# Patient Record
Sex: FEMALE | Race: WHITE | NOT HISPANIC OR LATINO | URBAN - METROPOLITAN AREA
[De-identification: names, ages, dates, MRNs, and addresses within clinical notes are randomized per-mention and may not be internally consistent; named-entity substitution may affect disease eponyms.]

---

## 2022-09-01 ENCOUNTER — INPATIENT HOSPITAL (OUTPATIENT)
Dept: URBAN - METROPOLITAN AREA HOSPITAL 107 | Facility: HOSPITAL | Age: 62
End: 2022-09-01

## 2022-09-01 DIAGNOSIS — R93.3 ABNORMAL FINDINGS ON DIAGNOSTIC IMAGING OF OTHER PARTS OF DI: ICD-10-CM

## 2022-09-01 DIAGNOSIS — K62.5 HEMORRHAGE OF ANUS AND RECTUM: ICD-10-CM

## 2022-09-01 DIAGNOSIS — D50.9 IRON DEFICIENCY ANEMIA, UNSPECIFIED: ICD-10-CM

## 2022-09-01 PROCEDURE — 99222 1ST HOSP IP/OBS MODERATE 55: CPT | Performed by: NURSE PRACTITIONER

## 2022-09-02 ENCOUNTER — INPATIENT HOSPITAL (OUTPATIENT)
Dept: URBAN - METROPOLITAN AREA HOSPITAL 107 | Facility: HOSPITAL | Age: 62
End: 2022-09-02

## 2022-09-02 DIAGNOSIS — K44.9 DIAPHRAGMATIC HERNIA WITHOUT OBSTRUCTION OR GANGRENE: ICD-10-CM

## 2022-09-02 DIAGNOSIS — K21.00 GASTRO-ESOPHAGEAL REFLUX DISEASE WITH ESOPHAGITIS, WITHOUT B: ICD-10-CM

## 2022-09-02 DIAGNOSIS — K29.70 GASTRITIS, UNSPECIFIED, WITHOUT BLEEDING: ICD-10-CM

## 2022-09-02 DIAGNOSIS — D50.0 IRON DEFICIENCY ANEMIA SECONDARY TO BLOOD LOSS (CHRONIC): ICD-10-CM

## 2022-09-02 PROCEDURE — 45378 DIAGNOSTIC COLONOSCOPY: CPT | Mod: 52 | Performed by: INTERNAL MEDICINE

## 2022-09-02 PROCEDURE — 43239 EGD BIOPSY SINGLE/MULTIPLE: CPT | Performed by: INTERNAL MEDICINE

## 2022-09-03 ENCOUNTER — INPATIENT HOSPITAL (OUTPATIENT)
Dept: URBAN - METROPOLITAN AREA HOSPITAL 107 | Facility: HOSPITAL | Age: 62
End: 2022-09-03

## 2022-09-03 DIAGNOSIS — D50.0 IRON DEFICIENCY ANEMIA SECONDARY TO BLOOD LOSS (CHRONIC): ICD-10-CM

## 2022-09-03 DIAGNOSIS — R19.5 OTHER FECAL ABNORMALITIES: ICD-10-CM

## 2022-09-03 PROCEDURE — 45378 DIAGNOSTIC COLONOSCOPY: CPT | Performed by: INTERNAL MEDICINE

## 2023-04-13 ENCOUNTER — INPATIENT HOSPITAL (OUTPATIENT)
Dept: URBAN - METROPOLITAN AREA HOSPITAL 107 | Facility: HOSPITAL | Age: 63
End: 2023-04-13

## 2023-04-13 DIAGNOSIS — D50.9 IRON DEFICIENCY ANEMIA, UNSPECIFIED: ICD-10-CM

## 2023-04-13 DIAGNOSIS — R19.5 OTHER FECAL ABNORMALITIES: ICD-10-CM

## 2023-04-13 PROCEDURE — 99222 1ST HOSP IP/OBS MODERATE 55: CPT | Performed by: NURSE PRACTITIONER

## 2023-04-14 ENCOUNTER — INPATIENT HOSPITAL (OUTPATIENT)
Dept: URBAN - METROPOLITAN AREA HOSPITAL 107 | Facility: HOSPITAL | Age: 63
End: 2023-04-14

## 2023-04-14 DIAGNOSIS — K31.84 GASTROPARESIS: ICD-10-CM

## 2023-04-14 DIAGNOSIS — R19.5 OTHER FECAL ABNORMALITIES: ICD-10-CM

## 2023-04-14 PROCEDURE — 99232 SBSQ HOSP IP/OBS MODERATE 35: CPT | Performed by: PHYSICIAN ASSISTANT

## 2023-04-15 ENCOUNTER — INPATIENT HOSPITAL (OUTPATIENT)
Dept: URBAN - METROPOLITAN AREA HOSPITAL 107 | Facility: HOSPITAL | Age: 63
End: 2023-04-15

## 2023-04-15 DIAGNOSIS — D64.9 ANEMIA, UNSPECIFIED: ICD-10-CM

## 2023-04-15 PROCEDURE — 99232 SBSQ HOSP IP/OBS MODERATE 35: CPT | Performed by: INTERNAL MEDICINE

## 2023-04-17 ENCOUNTER — INPATIENT HOSPITAL (OUTPATIENT)
Dept: URBAN - METROPOLITAN AREA HOSPITAL 107 | Facility: HOSPITAL | Age: 63
End: 2023-04-17
Payer: MEDICARE

## 2023-04-17 DIAGNOSIS — D50.9 IRON DEFICIENCY ANEMIA, UNSPECIFIED: ICD-10-CM

## 2023-04-17 DIAGNOSIS — K44.9 DIAPHRAGMATIC HERNIA WITHOUT OBSTRUCTION OR GANGRENE: ICD-10-CM

## 2023-04-17 DIAGNOSIS — K20.90 ESOPHAGITIS, UNSPECIFIED WITHOUT BLEEDING: ICD-10-CM

## 2023-04-17 DIAGNOSIS — K31.819 ANGIODYSPLASIA OF STOMACH AND DUODENUM WITHOUT BLEEDING: ICD-10-CM

## 2023-04-17 PROCEDURE — 43239 EGD BIOPSY SINGLE/MULTIPLE: CPT | Mod: 59 | Performed by: INTERNAL MEDICINE

## 2023-04-17 PROCEDURE — 43270 EGD LESION ABLATION: CPT | Performed by: INTERNAL MEDICINE

## 2023-05-27 ENCOUNTER — INPATIENT HOSPITAL (OUTPATIENT)
Dept: URBAN - METROPOLITAN AREA HOSPITAL 107 | Facility: HOSPITAL | Age: 63
End: 2023-05-27
Payer: MEDICARE

## 2023-05-27 DIAGNOSIS — K92.0 HEMATEMESIS: ICD-10-CM

## 2023-05-27 PROCEDURE — 99222 1ST HOSP IP/OBS MODERATE 55: CPT | Performed by: INTERNAL MEDICINE

## 2023-05-30 ENCOUNTER — INPATIENT HOSPITAL (OUTPATIENT)
Dept: URBAN - METROPOLITAN AREA HOSPITAL 107 | Facility: HOSPITAL | Age: 63
End: 2023-05-30

## 2023-05-30 DIAGNOSIS — K31.84 GASTROPARESIS: ICD-10-CM

## 2023-05-30 DIAGNOSIS — D50.9 IRON DEFICIENCY ANEMIA, UNSPECIFIED: ICD-10-CM

## 2023-05-30 DIAGNOSIS — K20.90 ESOPHAGITIS, UNSPECIFIED WITHOUT BLEEDING: ICD-10-CM

## 2023-05-30 PROCEDURE — 99232 SBSQ HOSP IP/OBS MODERATE 35: CPT | Performed by: NURSE PRACTITIONER

## 2023-09-20 ENCOUNTER — OFFICE (OUTPATIENT)
Dept: URBAN - METROPOLITAN AREA CLINIC 76 | Facility: CLINIC | Age: 63
End: 2023-09-20
Payer: MEDICARE

## 2023-09-20 VITALS
OXYGEN SATURATION: 95 % | WEIGHT: 84 LBS | HEART RATE: 76 BPM | HEIGHT: 63 IN | DIASTOLIC BLOOD PRESSURE: 80 MMHG | SYSTOLIC BLOOD PRESSURE: 120 MMHG

## 2023-09-20 DIAGNOSIS — R11.2 NAUSEA WITH VOMITING, UNSPECIFIED: ICD-10-CM

## 2023-09-20 DIAGNOSIS — R63.4 ABNORMAL WEIGHT LOSS: ICD-10-CM

## 2023-09-20 DIAGNOSIS — K31.84 GASTROPARESIS: ICD-10-CM

## 2023-09-20 DIAGNOSIS — R19.4 CHANGE IN BOWEL HABIT: ICD-10-CM

## 2023-09-20 DIAGNOSIS — K92.1 MELENA: ICD-10-CM

## 2023-09-20 PROCEDURE — 99214 OFFICE O/P EST MOD 30 MIN: CPT | Performed by: INTERNAL MEDICINE

## 2023-09-20 RX ORDER — PANTOPRAZOLE SODIUM 40 MG/1
80 TABLET, DELAYED RELEASE ORAL
Qty: 90 | Refills: 4 | Status: ACTIVE
Start: 2023-09-20

## 2023-12-20 ENCOUNTER — OFFICE (OUTPATIENT)
Dept: URBAN - METROPOLITAN AREA CLINIC 76 | Facility: CLINIC | Age: 63
End: 2023-12-20

## 2023-12-20 VITALS
HEART RATE: 83 BPM | OXYGEN SATURATION: 83 % | WEIGHT: 87 LBS | DIASTOLIC BLOOD PRESSURE: 78 MMHG | HEIGHT: 63 IN | SYSTOLIC BLOOD PRESSURE: 109 MMHG

## 2023-12-20 DIAGNOSIS — K20.90 ESOPHAGITIS, UNSPECIFIED WITHOUT BLEEDING: ICD-10-CM

## 2023-12-20 DIAGNOSIS — K31.84 GASTROPARESIS: ICD-10-CM

## 2023-12-20 DIAGNOSIS — R63.4 ABNORMAL WEIGHT LOSS: ICD-10-CM

## 2023-12-20 DIAGNOSIS — R13.10 DYSPHAGIA, UNSPECIFIED: ICD-10-CM

## 2023-12-20 DIAGNOSIS — R11.2 NAUSEA WITH VOMITING, UNSPECIFIED: ICD-10-CM

## 2023-12-20 DIAGNOSIS — K59.00 CONSTIPATION, UNSPECIFIED: ICD-10-CM

## 2023-12-20 PROCEDURE — 99214 OFFICE O/P EST MOD 30 MIN: CPT

## 2023-12-20 RX ORDER — PROMETHAZINE HYDROCHLORIDE 25 MG/1
100 TABLET ORAL
Qty: 40 | Refills: 5 | Status: ACTIVE
Start: 2023-12-20

## 2024-02-16 ENCOUNTER — ON CAMPUS - OUTPATIENT (OUTPATIENT)
Dept: URBAN - METROPOLITAN AREA HOSPITAL 108 | Facility: HOSPITAL | Age: 64
End: 2024-02-16
Payer: COMMERCIAL

## 2024-02-16 DIAGNOSIS — R13.10 DYSPHAGIA, UNSPECIFIED: ICD-10-CM

## 2024-02-16 DIAGNOSIS — K20.91 ESOPHAGITIS, UNSPECIFIED WITH BLEEDING: ICD-10-CM

## 2024-02-16 DIAGNOSIS — K31.7 POLYP OF STOMACH AND DUODENUM: ICD-10-CM

## 2024-02-16 DIAGNOSIS — Z87.19 PERSONAL HISTORY OF OTHER DISEASES OF THE DIGESTIVE SYSTEM: ICD-10-CM

## 2024-02-16 PROCEDURE — 43239 EGD BIOPSY SINGLE/MULTIPLE: CPT | Performed by: INTERNAL MEDICINE

## 2024-02-26 ENCOUNTER — HOSPITAL ENCOUNTER (OUTPATIENT)
Facility: HOSPITAL | Age: 64
Setting detail: OBSERVATION
Discharge: HOME | End: 2024-02-29
Attending: EMERGENCY MEDICINE | Admitting: HOSPITALIST
Payer: COMMERCIAL

## 2024-02-26 DIAGNOSIS — L03.116 CELLULITIS OF LEFT LOWER EXTREMITY: Primary | ICD-10-CM

## 2024-02-26 DIAGNOSIS — R79.89 ELEVATED D-DIMER: ICD-10-CM

## 2024-02-26 DIAGNOSIS — M54.42 CHRONIC MIDLINE LOW BACK PAIN WITH LEFT-SIDED SCIATICA: ICD-10-CM

## 2024-02-26 DIAGNOSIS — G89.29 CHRONIC MIDLINE LOW BACK PAIN WITH LEFT-SIDED SCIATICA: ICD-10-CM

## 2024-02-26 LAB
ALBUMIN SERPL-MCNC: 3.7 G/DL (ref 3.5–5.2)
ALBUMIN/GLOB SERPL: 0.9 G/DL
ALP SERPL-CCNC: 104 U/L (ref 39–117)
ALT SERPL W P-5'-P-CCNC: 11 U/L (ref 1–33)
ANION GAP SERPL CALCULATED.3IONS-SCNC: 8.7 MMOL/L (ref 5–15)
AST SERPL-CCNC: 17 U/L (ref 1–32)
BASOPHILS # BLD AUTO: 0.1 10*3/MM3 (ref 0–0.2)
BASOPHILS NFR BLD AUTO: 0.6 % (ref 0–1.5)
BILIRUB SERPL-MCNC: <0.2 MG/DL (ref 0–1.2)
BUN SERPL-MCNC: 23 MG/DL (ref 8–23)
BUN/CREAT SERPL: 40.4 (ref 7–25)
CALCIUM SPEC-SCNC: 9.3 MG/DL (ref 8.6–10.5)
CHLORIDE SERPL-SCNC: 100 MMOL/L (ref 98–107)
CO2 SERPL-SCNC: 30.3 MMOL/L (ref 22–29)
CREAT SERPL-MCNC: 0.57 MG/DL (ref 0.57–1)
D DIMER PPP FEU-MCNC: 2.04 MCGFEU/ML (ref 0–0.61)
D-LACTATE SERPL-SCNC: 0.9 MMOL/L (ref 0.5–2)
DEPRECATED RDW RBC AUTO: 41.5 FL (ref 37–54)
EGFRCR SERPLBLD CKD-EPI 2021: 103.5 ML/MIN/1.73
EOSINOPHIL # BLD AUTO: 0.81 10*3/MM3 (ref 0–0.4)
EOSINOPHIL NFR BLD AUTO: 4.7 % (ref 0.3–6.2)
ERYTHROCYTE [DISTWIDTH] IN BLOOD BY AUTOMATED COUNT: 14.2 % (ref 12.3–15.4)
GLOBULIN UR ELPH-MCNC: 4.2 GM/DL
GLUCOSE SERPL-MCNC: 103 MG/DL (ref 65–99)
HCT VFR BLD AUTO: 35.1 % (ref 34–46.6)
HGB BLD-MCNC: 11 G/DL (ref 12–15.9)
IMM GRANULOCYTES # BLD AUTO: 0.06 10*3/MM3 (ref 0–0.05)
IMM GRANULOCYTES NFR BLD AUTO: 0.4 % (ref 0–0.5)
LYMPHOCYTES # BLD AUTO: 1.6 10*3/MM3 (ref 0.7–3.1)
LYMPHOCYTES NFR BLD AUTO: 9.3 % (ref 19.6–45.3)
MCH RBC QN AUTO: 25.9 PG (ref 26.6–33)
MCHC RBC AUTO-ENTMCNC: 31.3 G/DL (ref 31.5–35.7)
MCV RBC AUTO: 82.8 FL (ref 79–97)
MONOCYTES # BLD AUTO: 0.95 10*3/MM3 (ref 0.1–0.9)
MONOCYTES NFR BLD AUTO: 5.5 % (ref 5–12)
NEUTROPHILS NFR BLD AUTO: 13.62 10*3/MM3 (ref 1.7–7)
NEUTROPHILS NFR BLD AUTO: 79.5 % (ref 42.7–76)
NRBC BLD AUTO-RTO: 0 /100 WBC (ref 0–0.2)
PLATELET # BLD AUTO: 540 10*3/MM3 (ref 140–450)
PMV BLD AUTO: 9.5 FL (ref 6–12)
POTASSIUM SERPL-SCNC: 4.2 MMOL/L (ref 3.5–5.2)
PROT SERPL-MCNC: 7.9 G/DL (ref 6–8.5)
RBC # BLD AUTO: 4.24 10*6/MM3 (ref 3.77–5.28)
SODIUM SERPL-SCNC: 139 MMOL/L (ref 136–145)
WBC NRBC COR # BLD AUTO: 17.14 10*3/MM3 (ref 3.4–10.8)

## 2024-02-26 PROCEDURE — 25010000002 ENOXAPARIN PER 10 MG: Performed by: EMERGENCY MEDICINE

## 2024-02-26 PROCEDURE — 99285 EMERGENCY DEPT VISIT HI MDM: CPT

## 2024-02-26 PROCEDURE — 25010000002 HYDROMORPHONE PER 4 MG: Performed by: EMERGENCY MEDICINE

## 2024-02-26 PROCEDURE — 96374 THER/PROPH/DIAG INJ IV PUSH: CPT

## 2024-02-26 PROCEDURE — 83605 ASSAY OF LACTIC ACID: CPT | Performed by: EMERGENCY MEDICINE

## 2024-02-26 PROCEDURE — 96376 TX/PRO/DX INJ SAME DRUG ADON: CPT

## 2024-02-26 PROCEDURE — 25010000002 ONDANSETRON PER 1 MG: Performed by: EMERGENCY MEDICINE

## 2024-02-26 PROCEDURE — 36415 COLL VENOUS BLD VENIPUNCTURE: CPT | Performed by: EMERGENCY MEDICINE

## 2024-02-26 PROCEDURE — 25010000002 CEFTRIAXONE PER 250 MG: Performed by: EMERGENCY MEDICINE

## 2024-02-26 PROCEDURE — G0378 HOSPITAL OBSERVATION PER HR: HCPCS

## 2024-02-26 PROCEDURE — 85025 COMPLETE CBC W/AUTO DIFF WBC: CPT | Performed by: EMERGENCY MEDICINE

## 2024-02-26 PROCEDURE — 87040 BLOOD CULTURE FOR BACTERIA: CPT | Performed by: EMERGENCY MEDICINE

## 2024-02-26 PROCEDURE — 80053 COMPREHEN METABOLIC PANEL: CPT | Performed by: EMERGENCY MEDICINE

## 2024-02-26 PROCEDURE — 96375 TX/PRO/DX INJ NEW DRUG ADDON: CPT

## 2024-02-26 PROCEDURE — 85379 FIBRIN DEGRADATION QUANT: CPT | Performed by: EMERGENCY MEDICINE

## 2024-02-26 PROCEDURE — 96372 THER/PROPH/DIAG INJ SC/IM: CPT

## 2024-02-26 PROCEDURE — 25010000002 HYDROMORPHONE 1 MG/ML SOLUTION: Performed by: EMERGENCY MEDICINE

## 2024-02-26 RX ORDER — ONDANSETRON 2 MG/ML
4 INJECTION INTRAMUSCULAR; INTRAVENOUS ONCE
Status: COMPLETED | OUTPATIENT
Start: 2024-02-26 | End: 2024-02-26

## 2024-02-26 RX ORDER — SODIUM CHLORIDE 0.9 % (FLUSH) 0.9 %
10 SYRINGE (ML) INJECTION AS NEEDED
Status: DISCONTINUED | OUTPATIENT
Start: 2024-02-26 | End: 2024-02-29 | Stop reason: HOSPADM

## 2024-02-26 RX ORDER — HYDROCODONE BITARTRATE AND ACETAMINOPHEN 5; 325 MG/1; MG/1
1 TABLET ORAL EVERY 4 HOURS PRN
Status: DISCONTINUED | OUTPATIENT
Start: 2024-02-26 | End: 2024-02-27

## 2024-02-26 RX ORDER — HYDROCODONE BITARTRATE AND ACETAMINOPHEN 5; 325 MG/1; MG/1
1 TABLET ORAL EVERY 6 HOURS PRN
Status: DISCONTINUED | OUTPATIENT
Start: 2024-02-26 | End: 2024-02-26

## 2024-02-26 RX ORDER — HYDROMORPHONE HYDROCHLORIDE 1 MG/ML
0.5 INJECTION, SOLUTION INTRAMUSCULAR; INTRAVENOUS; SUBCUTANEOUS ONCE
Status: COMPLETED | OUTPATIENT
Start: 2024-02-26 | End: 2024-02-26

## 2024-02-26 RX ORDER — ERGOCALCIFEROL 1.25 MG/1
50000 CAPSULE ORAL WEEKLY
COMMUNITY

## 2024-02-26 RX ORDER — ENOXAPARIN SODIUM 100 MG/ML
1 INJECTION SUBCUTANEOUS ONCE
Status: COMPLETED | OUTPATIENT
Start: 2024-02-26 | End: 2024-02-26

## 2024-02-26 RX ORDER — DULOXETIN HYDROCHLORIDE 30 MG/1
30 CAPSULE, DELAYED RELEASE ORAL DAILY
COMMUNITY

## 2024-02-26 RX ORDER — PANTOPRAZOLE SODIUM 40 MG/1
40 TABLET, DELAYED RELEASE ORAL DAILY
COMMUNITY
End: 2024-02-29 | Stop reason: HOSPADM

## 2024-02-26 RX ORDER — PANTOPRAZOLE SODIUM 40 MG/1
40 TABLET, DELAYED RELEASE ORAL
Status: DISCONTINUED | OUTPATIENT
Start: 2024-02-27 | End: 2024-02-27

## 2024-02-26 RX ORDER — ONDANSETRON 2 MG/ML
4 INJECTION INTRAMUSCULAR; INTRAVENOUS EVERY 6 HOURS PRN
Status: DISCONTINUED | OUTPATIENT
Start: 2024-02-26 | End: 2024-02-27

## 2024-02-26 RX ORDER — AMOXICILLIN AND CLAVULANATE POTASSIUM 875; 125 MG/1; MG/1
1 TABLET, FILM COATED ORAL 2 TIMES DAILY
COMMUNITY
End: 2024-02-29 | Stop reason: HOSPADM

## 2024-02-26 RX ORDER — METHOCARBAMOL 500 MG/1
500 TABLET, FILM COATED ORAL DAILY
COMMUNITY
End: 2024-02-29 | Stop reason: HOSPADM

## 2024-02-26 RX ORDER — MONTELUKAST SODIUM 10 MG/1
10 TABLET ORAL DAILY
COMMUNITY

## 2024-02-26 RX ORDER — ENOXAPARIN SODIUM 100 MG/ML
1 INJECTION SUBCUTANEOUS EVERY 12 HOURS
Status: DISCONTINUED | OUTPATIENT
Start: 2024-02-27 | End: 2024-02-29

## 2024-02-26 RX ORDER — CYCLOBENZAPRINE HCL 5 MG
5 TABLET ORAL 3 TIMES DAILY PRN
COMMUNITY
End: 2024-02-29 | Stop reason: HOSPADM

## 2024-02-26 RX ORDER — LITHIUM CARBONATE 150 MG/1
150 CAPSULE ORAL 2 TIMES DAILY
COMMUNITY

## 2024-02-26 RX ADMIN — CEFTRIAXONE 2000 MG: 2 INJECTION, POWDER, FOR SOLUTION INTRAMUSCULAR; INTRAVENOUS at 21:14

## 2024-02-26 RX ADMIN — HYDROMORPHONE HYDROCHLORIDE 1 MG: 1 INJECTION, SOLUTION INTRAMUSCULAR; INTRAVENOUS; SUBCUTANEOUS at 19:36

## 2024-02-26 RX ADMIN — ENOXAPARIN SODIUM 40 MG: 100 INJECTION SUBCUTANEOUS at 20:22

## 2024-02-26 RX ADMIN — HYDROMORPHONE HYDROCHLORIDE 0.5 MG: 1 INJECTION, SOLUTION INTRAMUSCULAR; INTRAVENOUS; SUBCUTANEOUS at 20:06

## 2024-02-26 RX ADMIN — ONDANSETRON 4 MG: 2 INJECTION INTRAMUSCULAR; INTRAVENOUS at 19:37

## 2024-02-26 RX ADMIN — HYDROCODONE BITARTRATE AND ACETAMINOPHEN 1 TABLET: 5; 325 TABLET ORAL at 22:23

## 2024-02-27 ENCOUNTER — APPOINTMENT (OUTPATIENT)
Dept: CT IMAGING | Facility: HOSPITAL | Age: 64
End: 2024-02-27
Payer: COMMERCIAL

## 2024-02-27 LAB
ANION GAP SERPL CALCULATED.3IONS-SCNC: 13.4 MMOL/L (ref 5–15)
BASOPHILS # BLD AUTO: 0.11 10*3/MM3 (ref 0–0.2)
BASOPHILS NFR BLD AUTO: 0.7 % (ref 0–1.5)
BUN SERPL-MCNC: 16 MG/DL (ref 8–23)
BUN/CREAT SERPL: 29.1 (ref 7–25)
CALCIUM SPEC-SCNC: 9.1 MG/DL (ref 8.6–10.5)
CHLORIDE SERPL-SCNC: 100 MMOL/L (ref 98–107)
CO2 SERPL-SCNC: 26.6 MMOL/L (ref 22–29)
CREAT SERPL-MCNC: 0.55 MG/DL (ref 0.57–1)
DEPRECATED RDW RBC AUTO: 41.7 FL (ref 37–54)
EGFRCR SERPLBLD CKD-EPI 2021: 104.4 ML/MIN/1.73
EOSINOPHIL # BLD AUTO: 0.97 10*3/MM3 (ref 0–0.4)
EOSINOPHIL NFR BLD AUTO: 6.4 % (ref 0.3–6.2)
ERYTHROCYTE [DISTWIDTH] IN BLOOD BY AUTOMATED COUNT: 13.9 % (ref 12.3–15.4)
GLUCOSE SERPL-MCNC: 107 MG/DL (ref 65–99)
HCT VFR BLD AUTO: 35.8 % (ref 34–46.6)
HGB BLD-MCNC: 11.2 G/DL (ref 12–15.9)
IMM GRANULOCYTES # BLD AUTO: 0.06 10*3/MM3 (ref 0–0.05)
IMM GRANULOCYTES NFR BLD AUTO: 0.4 % (ref 0–0.5)
LYMPHOCYTES # BLD AUTO: 2.1 10*3/MM3 (ref 0.7–3.1)
LYMPHOCYTES NFR BLD AUTO: 13.9 % (ref 19.6–45.3)
MCH RBC QN AUTO: 26.1 PG (ref 26.6–33)
MCHC RBC AUTO-ENTMCNC: 31.3 G/DL (ref 31.5–35.7)
MCV RBC AUTO: 83.4 FL (ref 79–97)
MONOCYTES # BLD AUTO: 1.01 10*3/MM3 (ref 0.1–0.9)
MONOCYTES NFR BLD AUTO: 6.7 % (ref 5–12)
NEUTROPHILS NFR BLD AUTO: 10.85 10*3/MM3 (ref 1.7–7)
NEUTROPHILS NFR BLD AUTO: 71.9 % (ref 42.7–76)
NRBC BLD AUTO-RTO: 0 /100 WBC (ref 0–0.2)
PLATELET # BLD AUTO: 566 10*3/MM3 (ref 140–450)
PMV BLD AUTO: 9.9 FL (ref 6–12)
POTASSIUM SERPL-SCNC: 4 MMOL/L (ref 3.5–5.2)
RBC # BLD AUTO: 4.29 10*6/MM3 (ref 3.77–5.28)
SODIUM SERPL-SCNC: 140 MMOL/L (ref 136–145)
WBC NRBC COR # BLD AUTO: 15.1 10*3/MM3 (ref 3.4–10.8)

## 2024-02-27 PROCEDURE — 96376 TX/PRO/DX INJ SAME DRUG ADON: CPT

## 2024-02-27 PROCEDURE — 25010000002 ONDANSETRON PER 1 MG: Performed by: HOSPITALIST

## 2024-02-27 PROCEDURE — 99204 OFFICE O/P NEW MOD 45 MIN: CPT | Performed by: PHYSICIAN ASSISTANT

## 2024-02-27 PROCEDURE — 74177 CT ABD & PELVIS W/CONTRAST: CPT

## 2024-02-27 PROCEDURE — 25010000002 ENOXAPARIN PER 10 MG: Performed by: HOSPITALIST

## 2024-02-27 PROCEDURE — 94640 AIRWAY INHALATION TREATMENT: CPT

## 2024-02-27 PROCEDURE — G0378 HOSPITAL OBSERVATION PER HR: HCPCS

## 2024-02-27 PROCEDURE — 25010000002 CEFAZOLIN IN DEXTROSE 2-4 GM/100ML-% SOLUTION: Performed by: HOSPITALIST

## 2024-02-27 PROCEDURE — 25010000002 HYDROMORPHONE PER 4 MG: Performed by: HOSPITALIST

## 2024-02-27 PROCEDURE — 25010000002 VANCOMYCIN 750 MG RECONSTITUTED SOLUTION 1 EACH VIAL: Performed by: INTERNAL MEDICINE

## 2024-02-27 PROCEDURE — 96372 THER/PROPH/DIAG INJ SC/IM: CPT

## 2024-02-27 PROCEDURE — 25810000003 SODIUM CHLORIDE 0.9 % SOLUTION 250 ML FLEX CONT: Performed by: INTERNAL MEDICINE

## 2024-02-27 PROCEDURE — 80048 BASIC METABOLIC PNL TOTAL CA: CPT | Performed by: HOSPITALIST

## 2024-02-27 PROCEDURE — 94799 UNLISTED PULMONARY SVC/PX: CPT

## 2024-02-27 PROCEDURE — 85025 COMPLETE CBC W/AUTO DIFF WBC: CPT | Performed by: HOSPITALIST

## 2024-02-27 PROCEDURE — 96375 TX/PRO/DX INJ NEW DRUG ADDON: CPT

## 2024-02-27 PROCEDURE — 25510000001 IOPAMIDOL 61 % SOLUTION: Performed by: HOSPITALIST

## 2024-02-27 RX ORDER — LITHIUM CARBONATE 150 MG/1
150 CAPSULE ORAL 2 TIMES DAILY
Status: DISCONTINUED | OUTPATIENT
Start: 2024-02-27 | End: 2024-02-29 | Stop reason: HOSPADM

## 2024-02-27 RX ORDER — MONTELUKAST SODIUM 10 MG/1
10 TABLET ORAL DAILY
Status: DISCONTINUED | OUTPATIENT
Start: 2024-02-27 | End: 2024-02-29 | Stop reason: HOSPADM

## 2024-02-27 RX ORDER — METHOCARBAMOL 500 MG/1
500 TABLET, FILM COATED ORAL EVERY 6 HOURS PRN
Status: DISCONTINUED | OUTPATIENT
Start: 2024-02-27 | End: 2024-02-29

## 2024-02-27 RX ORDER — PANTOPRAZOLE SODIUM 40 MG/10ML
40 INJECTION, POWDER, LYOPHILIZED, FOR SOLUTION INTRAVENOUS EVERY 12 HOURS SCHEDULED
Status: DISCONTINUED | OUTPATIENT
Start: 2024-02-27 | End: 2024-02-29

## 2024-02-27 RX ORDER — CEFAZOLIN SODIUM 2 G/100ML
2000 INJECTION, SOLUTION INTRAVENOUS EVERY 8 HOURS
Status: DISCONTINUED | OUTPATIENT
Start: 2024-02-27 | End: 2024-02-29

## 2024-02-27 RX ORDER — HYDROMORPHONE HYDROCHLORIDE 1 MG/ML
0.5 INJECTION, SOLUTION INTRAMUSCULAR; INTRAVENOUS; SUBCUTANEOUS ONCE
Status: DISCONTINUED | OUTPATIENT
Start: 2024-02-27 | End: 2024-02-27

## 2024-02-27 RX ORDER — DULOXETIN HYDROCHLORIDE 30 MG/1
30 CAPSULE, DELAYED RELEASE ORAL DAILY
Status: DISCONTINUED | OUTPATIENT
Start: 2024-02-27 | End: 2024-02-29 | Stop reason: HOSPADM

## 2024-02-27 RX ORDER — PANTOPRAZOLE SODIUM 40 MG/1
40 TABLET, DELAYED RELEASE ORAL
Status: DISCONTINUED | OUTPATIENT
Start: 2024-02-27 | End: 2024-02-27

## 2024-02-27 RX ORDER — ONDANSETRON 2 MG/ML
4 INJECTION INTRAMUSCULAR; INTRAVENOUS EVERY 4 HOURS PRN
Status: DISCONTINUED | OUTPATIENT
Start: 2024-02-27 | End: 2024-02-28

## 2024-02-27 RX ORDER — METHOCARBAMOL 500 MG/1
500 TABLET, FILM COATED ORAL DAILY
Status: DISCONTINUED | OUTPATIENT
Start: 2024-02-27 | End: 2024-02-27

## 2024-02-27 RX ORDER — AMITRIPTYLINE HYDROCHLORIDE 50 MG/1
50 TABLET, FILM COATED ORAL NIGHTLY
Status: DISCONTINUED | OUTPATIENT
Start: 2024-02-27 | End: 2024-02-29 | Stop reason: HOSPADM

## 2024-02-27 RX ORDER — IPRATROPIUM BROMIDE AND ALBUTEROL SULFATE 2.5; .5 MG/3ML; MG/3ML
3 SOLUTION RESPIRATORY (INHALATION) EVERY 4 HOURS PRN
Status: DISCONTINUED | OUTPATIENT
Start: 2024-02-27 | End: 2024-02-29

## 2024-02-27 RX ORDER — AMLODIPINE BESYLATE 5 MG/1
5 TABLET ORAL
Status: DISCONTINUED | OUTPATIENT
Start: 2024-02-27 | End: 2024-02-28

## 2024-02-27 RX ORDER — HYDROMORPHONE HYDROCHLORIDE 1 MG/ML
0.5 INJECTION, SOLUTION INTRAMUSCULAR; INTRAVENOUS; SUBCUTANEOUS EVERY 4 HOURS PRN
Status: DISCONTINUED | OUTPATIENT
Start: 2024-02-27 | End: 2024-02-29

## 2024-02-27 RX ADMIN — ONDANSETRON 4 MG: 2 INJECTION INTRAMUSCULAR; INTRAVENOUS at 14:42

## 2024-02-27 RX ADMIN — HYDROMORPHONE HYDROCHLORIDE 0.5 MG: 1 INJECTION, SOLUTION INTRAMUSCULAR; INTRAVENOUS; SUBCUTANEOUS at 18:04

## 2024-02-27 RX ADMIN — ONDANSETRON 4 MG: 2 INJECTION INTRAMUSCULAR; INTRAVENOUS at 19:53

## 2024-02-27 RX ADMIN — HYDROMORPHONE HYDROCHLORIDE 0.5 MG: 1 INJECTION, SOLUTION INTRAMUSCULAR; INTRAVENOUS; SUBCUTANEOUS at 13:57

## 2024-02-27 RX ADMIN — HYDROCODONE BITARTRATE AND ACETAMINOPHEN 1 TABLET: 5; 325 TABLET ORAL at 03:56

## 2024-02-27 RX ADMIN — TIOTROPIUM BROMIDE INHALATION SPRAY 1 PUFF: 3.12 SPRAY, METERED RESPIRATORY (INHALATION) at 12:14

## 2024-02-27 RX ADMIN — IOPAMIDOL 85 ML: 612 INJECTION, SOLUTION INTRAVENOUS at 22:05

## 2024-02-27 RX ADMIN — ONDANSETRON 4 MG: 2 INJECTION INTRAMUSCULAR; INTRAVENOUS at 05:01

## 2024-02-27 RX ADMIN — HYDROCODONE BITARTRATE AND ACETAMINOPHEN 1 TABLET: 5; 325 TABLET ORAL at 09:29

## 2024-02-27 RX ADMIN — VANCOMYCIN HYDROCHLORIDE 750 MG: 750 INJECTION, POWDER, LYOPHILIZED, FOR SOLUTION INTRAVENOUS at 23:17

## 2024-02-27 RX ADMIN — ENOXAPARIN SODIUM 40 MG: 100 INJECTION SUBCUTANEOUS at 09:29

## 2024-02-27 RX ADMIN — AMLODIPINE BESYLATE 5 MG: 5 TABLET ORAL at 18:04

## 2024-02-27 RX ADMIN — CEFAZOLIN SODIUM 2000 MG: 2 INJECTION, SOLUTION INTRAVENOUS at 14:49

## 2024-02-27 RX ADMIN — HYDROMORPHONE HYDROCHLORIDE 0.5 MG: 1 INJECTION, SOLUTION INTRAMUSCULAR; INTRAVENOUS; SUBCUTANEOUS at 22:29

## 2024-02-27 RX ADMIN — PANTOPRAZOLE SODIUM 40 MG: 40 INJECTION, POWDER, FOR SOLUTION INTRAVENOUS at 20:44

## 2024-02-27 NOTE — CONSULTS
[facility]  Initial Inpatient Consult Note    Referring Provider: Dr. Rodrigues    Reason for Consultation: esophagitis, gastroparesis, coffee ground emesis    Subjective     History of present illness:    63 y.o. female, a patient of Germán BURCH (Dr. Shon Springer) w/ PMHx of IBS, GERD, HTN, COPD, gastroparesis, presented to the ED w/ acute L leg pain and swelling, found to have cellulitis. DVT is being ruled out, imaging pending.     GI consulted for ongoing coffee ground emesis that just started. She reports ongoing issue w/ gastroparesis that she manages via diet. Has had issues w/ GI bleed in the past as well. Denies melena currently or hematochezia. Denies NSAIDs use or ASA use at home.     Reviewed records [facility]:     EGD 2024:  LA grade D esophagitis w/oozing was found in distal esophagus. Biopsied. Few sessile polyps w/ no stigmata of recent bleeding found in gastric body. Entire examined stomach was otherwise normal. Examined duodenum was normal.   Path: Reflux esophagitis and reactive epithelial changes.     EGD in 2023  Showing esophagitis, duodenal AVM, s\p APC, HH, esophageal nodule.   EGD in :   shows distal esophageal ulceration, no H.pylori, no celiac on histology.     Past Medical History:  Past Medical History:   Diagnosis Date    Anxiety and depression     Arthritis     GERD (gastroesophageal reflux disease)     Hypertension     IBS (irritable bowel syndrome)      Past Surgical History:  Past Surgical History:   Procedure Laterality Date    BACK SURGERY       SECTION      GASTROSTOMY FEEDING TUBE INSERTION      LAPAROSCOPIC TUBAL LIGATION        Social History:   Social History     Tobacco Use    Smoking status: Never    Smokeless tobacco: Never   Substance Use Topics    Alcohol use: Yes     Comment: social      Family History:  Family History   Problem Relation Age of Onset    Depression Mother     Osteoporosis Mother     Alcohol abuse Mother     Uterine  cancer Mother     Depression Maternal Grandmother        Home Meds:  Medications Prior to Admission   Medication Sig Dispense Refill Last Dose    amoxicillin-clavulanate (AUGMENTIN) 875-125 MG per tablet Take 1 tablet by mouth 2 (Two) Times a Day.       cyclobenzaprine (FLEXERIL) 5 MG tablet Take 1 tablet by mouth 3 (Three) Times a Day As Needed for Muscle Spasms.       DULoxetine (CYMBALTA) 30 MG capsule Take 1 capsule by mouth Daily.       ipratropium-albuterol (COMBIVENT RESPIMAT)  MCG/ACT inhaler Inhale 1 puff 2 (Two) Times a Day.       lithium carbonate 150 MG capsule Take 1 capsule by mouth 2 (Two) Times a Day.       methocarbamol (ROBAXIN) 500 MG tablet Take 1 tablet by mouth Daily.       montelukast (SINGULAIR) 10 MG tablet Take 1 tablet by mouth Daily.       pantoprazole (PROTONIX) 40 MG EC tablet Take 1 tablet by mouth Daily.       promethazine (PHENERGAN) 25 MG tablet        tiotropium bromide monohydrate (SPIRIVA RESPIMAT) 2.5 MCG/ACT aerosol solution inhaler Inhale 1 puff Daily.       vitamin D (ERGOCALCIFEROL) 1.25 MG (56676 UT) capsule capsule Take 1 capsule by mouth 1 (One) Time Per Week. Takes every Monday       amitriptyline (ELAVIL) 50 MG tablet Take 50 mg by mouth.       conjugated estrogens (PREMARIN) 0.625 MG/GM vaginal cream Insert  into the vagina Daily. For 2 weeks and then 2-3 times a week as needed 1 each 1     omeprazole (priLOSEC) 40 MG capsule         Current Meds:   amitriptyline, 50 mg, Oral, Nightly  amLODIPine, 5 mg, Oral, Q24H  ceFAZolin, 2,000 mg, Intravenous, Q8H  DULoxetine, 30 mg, Oral, Daily  enoxaparin, 1 mg/kg, Subcutaneous, Q12H  lithium carbonate, 150 mg, Oral, BID  metoprolol tartrate, 12.5 mg, Oral, Q12H  montelukast, 10 mg, Oral, Daily  pantoprazole, 40 mg, Oral, BID AC  tiotropium bromide monohydrate, 1 puff, Inhalation, Daily - RT      Allergies:  Allergies   Allergen Reactions    Penicillins Hives       Objective     Vital Signs  Temp:  [97 °F (36.1 °C)-98.6 °F  (37 °C)] 98.6 °F (37 °C)  Heart Rate:  [] 88  Resp:  [16-20] 20  BP: (122-176)/() 152/106  Physical Exam:  General Appearance:     Alert, cooperative; in mild distress   Abdomen:     Normal bowel sounds, no masses, no organomegaly, soft     nontender, nondistended, no guarding, no rebound                 tenderness   Rectal:     Deferred       Results Review:   I reviewed the patient's new clinical results.    Results from last 7 days   Lab Units 02/27/24  0511 02/26/24  1939   WBC 10*3/mm3 15.10* 17.14*   HEMOGLOBIN g/dL 11.2* 11.0*   HEMATOCRIT % 35.8 35.1   PLATELETS 10*3/mm3 566* 540*     Results from last 7 days   Lab Units 02/27/24 0511 02/26/24 2004   SODIUM mmol/L 140 139   POTASSIUM mmol/L 4.0 4.2   CHLORIDE mmol/L 100 100   CO2 mmol/L 26.6 30.3*   BUN mg/dL 16 23   CREATININE mg/dL 0.55* 0.57   CALCIUM mg/dL 9.1 9.3   BILIRUBIN mg/dL  --  <0.2   ALK PHOS U/L  --  104   ALT (SGPT) U/L  --  11   AST (SGOT) U/L  --  17   GLUCOSE mg/dL 107* 103*         Lab Results   Lab Value Date/Time    LIPASE 6 05/22/2023 1356       Radiology:  No orders to display         Assessment:   Cellulitis  Coffee ground emesis- likely due to known esophagitis. Her n/v likely exacerbated by gastroparesis in setting of infection  LA grade D Esophagitis as seen on EGD 02/16/2024 at [facility]     Plan:   Will switch to PPI IV BID.   Monitor H/H, transfuse if Hgb < 7.  LE duplex pending today to r/o DVT. If there is evidence of DVT, okay to receive next dose lovenox subq, otherwise recommend holding the dose given ongoing GI bleed  Plan EGD tomorrow if H/H drops and ongoing, persistent GI bleed, otherwise would treat this conservatively with PPI. NPO at midnight and will reassess the need for EGD tomorrow am.     I discussed the patients findings and my recommendations with patient.         Gianfranco Jeffries PA-C  [facility]  [address] [id number]  Office: [phone]  [phone]

## 2024-02-27 NOTE — H&P
History and physical    Primary care physician      Chief complaint  Left leg swelling redness and pain    History of present illness  63-year-old white female with history of chronic hypoxic respiratory failure on home oxygen gastroparesis anxiety depression degenerative disc disease chronic pain syndrome presented to [facility] emergency room with left leg pain swelling and redness for last 4 days which is getting worse.  Patient denies any fall trauma injury.  Patient denies any fever chills chest pain increase shortness of breath palpitation.  Patient evaluated in ER found to have left lower extremity colitis and also found to have elevated D-dimer need to rule out DVT admitted for management.  At the time of examination she is in a lot of pain asking for pain medication.  Patient give me detailed history and her  also contributed.    PAST MEDICAL HISTORY   Anxiety and depression      Gastroparesis      Gastroesophageal reflux disease      Hypertension      Irritable bowel syndrome  Degenerative disc disease  Chronic pain syndrome  Chronic hypoxic respiratory failure        PAST SURGICAL HISTORY              Procedure Laterality Date    BACK SURGERY         SECTION        GASTROSTOMY FEEDING TUBE INSERTION        LAPAROSCOPIC TUBAL LIGATION             FAMILY HISTORY           Problem Relation Age of Onset    Depression Mother      Osteoporosis Mother      Alcohol abuse Mother      Uterine cancer Mother      Depression Maternal Grandmother        SOCIAL HISTORY                 Socioeconomic History    Marital status:    Tobacco Use    Smoking status: Never    Smokeless tobacco: Never   Substance and Sexual Activity    Alcohol use: Yes       Comment: social    Drug use: No    Sexual activity: Yes       Partners: Male       Birth control/protection: Surgical         ALLERGIES  Penicillins  Home medications reviewed     REVIEW OF SYSTEMS  Constitutional:  Negative for fever.  "  Respiratory: Negative for shortness of breath  Cardiovascular:  Negative for chest pain.   Musculoskeletal:  Positive for back pain.        Left leg pain and swelling as per HPI   All other systems reviewed and are negative.     PHYSICAL EXAM   Blood pressure 163/93, pulse 102, temperature 97.1 °F (36.2 °C), temperature source Oral, resp. rate 20, height 160 cm (62.99\"), weight 43.5 kg (95 lb 14.4 oz), SpO2 98%.    GENERAL: Awake and alert and  no distress  SKIN: Examination of the foot reveals some warmth and erythema on the lateral foot going up towards the ankle consistent with some possible developing cellulitis.  HENT: Normocephalic, atraumatic  EYES: no scleral icterus  CV: regular rhythm, regular rate  RESPIRATORY: Normal effort and moving air bilaterally  ABDOMEN: soft, nontender, nondistended bowel sounds positive  MUSCULOSKELETAL: Moderate swelling of the left leg from the knee down to the foot.    NEURO: alert, moves all extremities, follows commands     LAB RESULTS  Lab Results (last 24 hours)       Procedure Component Value Units Date/Time    Basic Metabolic Panel [[id number]]  (Abnormal) Collected: 02/27/24 0511    Specimen: Blood Updated: 02/27/24 0615     Glucose 107 mg/dL      BUN 16 mg/dL      Creatinine 0.55 mg/dL      Sodium 140 mmol/L      Potassium 4.0 mmol/L      Chloride 100 mmol/L      CO2 26.6 mmol/L      Calcium 9.1 mg/dL      BUN/Creatinine Ratio 29.1     Anion Gap 13.4 mmol/L      eGFR 104.4 mL/min/1.73     Narrative:      GFR Normal >60  Chronic Kidney Disease <60  Kidney Failure <15      CBC & Differential [[id number]]  (Abnormal) Collected: 02/27/24 0511    Specimen: Blood Updated: 02/27/24 0555    Narrative:      The following orders were created for panel order CBC & Differential.  Procedure                               Abnormality         Status                     ---------                               -----------         ------                     CBC Auto " Differential[[id number]]        Abnormal            Final result                 Please view results for these tests on the individual orders.    CBC Auto Differential [[id number]]  (Abnormal) Collected: 02/27/24 0511    Specimen: Blood Updated: 02/27/24 0555     WBC 15.10 10*3/mm3      RBC 4.29 10*6/mm3      Hemoglobin 11.2 g/dL      Hematocrit 35.8 %      MCV 83.4 fL      MCH 26.1 pg      MCHC 31.3 g/dL      RDW 13.9 %      RDW-SD 41.7 fl      MPV 9.9 fL      Platelets 566 10*3/mm3      Neutrophil % 71.9 %      Lymphocyte % 13.9 %      Monocyte % 6.7 %      Eosinophil % 6.4 %      Basophil % 0.7 %      Immature Grans % 0.4 %      Neutrophils, Absolute 10.85 10*3/mm3      Lymphocytes, Absolute 2.10 10*3/mm3      Monocytes, Absolute 1.01 10*3/mm3      Eosinophils, Absolute 0.97 10*3/mm3      Basophils, Absolute 0.11 10*3/mm3      Immature Grans, Absolute 0.06 10*3/mm3      nRBC 0.0 /100 WBC     Lactic Acid, Plasma [[id number]]  (Normal) Collected: 02/26/24 2106    Specimen: Blood Updated: 02/26/24 2148     Lactate 0.9 mmol/L     Blood Culture - Blood, Arm, Left [[id number]] Collected: 02/26/24 2059    Specimen: Blood from Arm, Left Updated: 02/26/24 2103    Comprehensive Metabolic Panel [[id number]]  (Abnormal) Collected: 02/26/24 2004    Specimen: Blood Updated: 02/26/24 2030     Glucose 103 mg/dL      BUN 23 mg/dL      Creatinine 0.57 mg/dL      Sodium 139 mmol/L      Potassium 4.2 mmol/L      Chloride 100 mmol/L      CO2 30.3 mmol/L      Calcium 9.3 mg/dL      Total Protein 7.9 g/dL      Albumin 3.7 g/dL      ALT (SGPT) 11 U/L      AST (SGOT) 17 U/L      Alkaline Phosphatase 104 U/L      Total Bilirubin <0.2 mg/dL      Globulin 4.2 gm/dL      A/G Ratio 0.9 g/dL      BUN/Creatinine Ratio 40.4     Anion Gap 8.7 mmol/L      eGFR 103.5 mL/min/1.73     Narrative:      GFR Normal >60  Chronic Kidney Disease <60  Kidney Failure <15      D-dimer, Quantitative [[id number]]  (Abnormal) Collected: 02/26/24 1939    Specimen: Blood  "Updated: 02/26/24 2008     D-Dimer, Quantitative 2.04 MCGFEU/mL     Narrative:      According to the assay 's published package insert, a normal (<0.50 MCGFEU/mL) D-dimer result in conjunction with a non-high clinical probability assessment, excludes deep vein thrombosis (DVT) and pulmonary embolism (PE) with high sensitivity.    D-dimer values increase with age and this can make VTE exclusion of an older population difficult. To address this, the [employer], based on best available evidence and recent guidelines, recommends that clinicians use age-adjusted D-dimer thresholds in patients greater than 50 years of age with: a) a low probability of PE who do not meet all Pulmonary Embolism Rule Out Criteria, or b) in those with intermediate probability of PE.   The formula for an age-adjusted D-dimer cut-off is \"age/100\".  For example, a 60 year old patient would have an age-adjusted cut-off of 0.60 MCGFEU/mL and an 80 year old 0.80 MCGFEU/mL.    CBC & Differential [[id number]]  (Abnormal) Collected: 02/26/24 1939    Specimen: Blood Updated: 02/26/24 1951    Narrative:      The following orders were created for panel order CBC & Differential.  Procedure                               Abnormality         Status                     ---------                               -----------         ------                     CBC Auto Differential[[id number]]        Abnormal            Final result                 Please view results for these tests on the individual orders.    CBC Auto Differential [[id number]]  (Abnormal) Collected: 02/26/24 1939    Specimen: Blood Updated: 02/26/24 1951     WBC 17.14 10*3/mm3      RBC 4.24 10*6/mm3      Hemoglobin 11.0 g/dL      Hematocrit 35.1 %      MCV 82.8 fL      MCH 25.9 pg      MCHC 31.3 g/dL      RDW 14.2 %      RDW-SD 41.5 fl      MPV 9.5 fL      Platelets 540 10*3/mm3      Neutrophil % 79.5 %      Lymphocyte % 9.3 %      Monocyte % 5.5 %      Eosinophil % " 4.7 %      Basophil % 0.6 %      Immature Grans % 0.4 %      Neutrophils, Absolute 13.62 10*3/mm3      Lymphocytes, Absolute 1.60 10*3/mm3      Monocytes, Absolute 0.95 10*3/mm3      Eosinophils, Absolute 0.81 10*3/mm3      Basophils, Absolute 0.10 10*3/mm3      Immature Grans, Absolute 0.06 10*3/mm3      nRBC 0.0 /100 WBC           Imaging Results (Last 24 Hours)       ** No results found for the last 24 hours. **            Current Facility-Administered Medications:     amitriptyline (ELAVIL) tablet 50 mg, 50 mg, Oral, Nightly, Lawson Rodrigues MD    amLODIPine (NORVASC) tablet 5 mg, 5 mg, Oral, Q24H, Lawson Rodrigues MD    cefTRIAXone (ROCEPHIN) 1,000 mg in sodium chloride 0.9 % 100 mL IVPB-VTB, 1,000 mg, Intravenous, Q24H, Lawson Rodrigues MD    DULoxetine (CYMBALTA) DR capsule 30 mg, 30 mg, Oral, Daily, Lawson Rodrigues MD    Enoxaparin Sodium (LOVENOX) syringe 40 mg, 1 mg/kg, Subcutaneous, Q12H, Lawson Rodrigues MD, 40 mg at 02/27/24 0929    HYDROmorphone (DILAUDID) injection 0.5 mg, 0.5 mg, Intravenous, Q4H PRN, Lawson Rodrigues MD    ipratropium-albuterol (DUO-NEB) nebulizer solution 3 mL, 3 mL, Nebulization, Q4H PRN, Lawson Rodrigues MD    lithium carbonate capsule 150 mg, 150 mg, Oral, BID, Lawson Rodrigues MD    methocarbamol (ROBAXIN) tablet 500 mg, 500 mg, Oral, Q6H PRN, Lawson Rodrigues MD    metoprolol tartrate (LOPRESSOR) tablet 12.5 mg, 12.5 mg, Oral, Q12H, Lawson Rodrigues MD    montelukast (SINGULAIR) tablet 10 mg, 10 mg, Oral, Daily, Lwason Rodrigues MD    ondansetron (ZOFRAN) injection 4 mg, 4 mg, Intravenous, Q6H PRN, Lawson Rodrigues MD, 4 mg at 02/27/24 0501    pantoprazole (PROTONIX) EC tablet 40 mg, 40 mg, Oral, Q AM, Lawson Rodrigues MD    [COMPLETED] Insert Peripheral IV, , , Once **AND** sodium chloride 0.9 % flush 10 mL, 10 mL, Intravenous, PRN, [place], Randy LORENZO MD    tiotropium (SPIRIVA RESPIMAT) 2.5 mcg/act aerosol solution inhaler, 1 puff, Inhalation, Daily - RT, Lawson Rodrigues MD, 1 puff at 02/27/24 1214      ASSESSMENT  Acute left lower extremity cellulitis  Elevated D-dimer rule out DVT  Chronic hypoxic respiratory failure  Hypertension  Gastroparesis  Irritable bowel syndrome  Degenerative disc disease  Bipolar disorder on lithium  Chronic pain syndrome  Gastroesophageal reflux disease    PLAN  Admit  IV antibiotics after obtaining blood cultures  Lovenox  Check lower extremity Doppler study  Pain management  Continue home medications  Stress ulcer DVT prophylaxis  Infectious disease consult  Supportive care  Patient is full code  Discussed with family nursing staff  Follow closely further recommendation current hospital course    RAJI BORDEN MD

## 2024-02-27 NOTE — PLAN OF CARE
Goal Outcome Evaluation:  Plan of Care Reviewed With: patient, spouse        Progress: no change  Outcome Evaluation: Elevated BP and HR, on 2L O2, remains afebrile. LLE redness remains unchanged from start of shift, dilaudid given for pain w/ relief, IV abx as ordered, otherwise SL, zofran given for nausea. Large amount of coffee ground emesis this afternoon. MD notified and GI consulted. Pt refused many PO meds due to n/v, education provided. Pt agreeable to take norvasc d/t elevated BP.

## 2024-02-27 NOTE — PLAN OF CARE
Goal Outcome Evaluation:  Plan of Care Reviewed With: patient        Progress: no change  Outcome Evaluation: Admitted from ED, LLE redness, swelling, warm, pt c/o of LLE pain- Norco given, emesis x1- IV Zofran given, saline locked, iv abx and Lovenox ordered, 2L O2 NC when sleeping, morning labs ordered,  at bedside

## 2024-02-27 NOTE — PROGRESS NOTES
Continued Stay Note  [facility]     Patient Name: Carolina Cooney  MRN: [id number]  Today's Date: 2/27/2024    Admit Date: 2/26/2024    Plan: Home no needs   Discharge Plan       Row Name 02/27/24 1337       Plan    Plan Home no needs    Plan Comments Introduced self and role of CCP. Patient confirmed DC plan is to return to home. Stated she is independent with ADL's. Has been uses a FW walker as needed.  Spouse will assist as needed and will provide transportation at DC. Denies any needs/equipment.                   Discharge Codes    No documentation.                       Brisa Donald RN

## 2024-02-27 NOTE — ED NOTES
..Nursing report ED to floor  Carolina Cooney  63 y.o.  female    HPI :  HPI (Adult)  Stated Reason for Visit: leg pain and swelling  History Obtained From: patient    Chief Complaint  Chief Complaint   Patient presents with    Leg Swelling       Admitting doctor:   Lawson Rodrigues MD    Admitting diagnosis:   The primary encounter diagnosis was Cellulitis of left lower extremity. Diagnoses of Elevated d-dimer and Chronic midline low back pain with left-sided sciatica were also pertinent to this visit.    Code status:   Current Code Status       Date Active Code Status Order ID Comments User Context       Not on file            Allergies:   Penicillins    Isolation:   No active isolations    Intake and Output  No intake or output data in the 24 hours ending 02/26/24 2033    Weight:       02/26/24 2015   Weight: 42.5 kg (93 lb 11.2 oz)       Most recent vitals:   Vitals:    02/26/24 1854 02/26/24 1904 02/26/24 2011 02/26/24 2015   BP:  (!) 149/106 137/84    Pulse: 106  94    Resp: 16      Temp: 98.3 °F (36.8 °C)      TempSrc: Tympanic      SpO2: 93%  95%    Weight:    42.5 kg (93 lb 11.2 oz)       Active LDAs/IV Access:   Lines, Drains & Airways       Active LDAs       Name Placement date Placement time Site Days    Peripheral IV 02/26/24 1934 Right Antecubital 02/26/24 1934  Antecubital  less than 1                    Labs (abnormal labs have a star):   Labs Reviewed   COMPREHENSIVE METABOLIC PANEL - Abnormal; Notable for the following components:       Result Value    Glucose 103 (*)     CO2 30.3 (*)     BUN/Creatinine Ratio 40.4 (*)     All other components within normal limits    Narrative:     GFR Normal >60  Chronic Kidney Disease <60  Kidney Failure <15     D-DIMER, QUANTITATIVE - Abnormal; Notable for the following components:    D-Dimer, Quantitative 2.04 (*)     All other components within normal limits    Narrative:     According to the assay 's published package insert, a normal (<0.50 MCGFEU/mL)  "D-dimer result in conjunction with a non-high clinical probability assessment, excludes deep vein thrombosis (DVT) and pulmonary embolism (PE) with high sensitivity.    D-dimer values increase with age and this can make VTE exclusion of an older population difficult. To address this, the [employer], based on best available evidence and recent guidelines, recommends that clinicians use age-adjusted D-dimer thresholds in patients greater than 50 years of age with: a) a low probability of PE who do not meet all Pulmonary Embolism Rule Out Criteria, or b) in those with intermediate probability of PE.   The formula for an age-adjusted D-dimer cut-off is \"age/100\".  For example, a 60 year old patient would have an age-adjusted cut-off of 0.60 MCGFEU/mL and an 80 year old 0.80 MCGFEU/mL.   CBC WITH AUTO DIFFERENTIAL - Abnormal; Notable for the following components:    WBC 17.14 (*)     Hemoglobin 11.0 (*)     MCH 25.9 (*)     MCHC 31.3 (*)     Platelets 540 (*)     Neutrophil % 79.5 (*)     Lymphocyte % 9.3 (*)     Neutrophils, Absolute 13.62 (*)     Monocytes, Absolute 0.95 (*)     Eosinophils, Absolute 0.81 (*)     Immature Grans, Absolute 0.06 (*)     All other components within normal limits   BLOOD CULTURE   BLOOD CULTURE   LACTIC ACID, PLASMA   CBC AND DIFFERENTIAL    Narrative:     The following orders were created for panel order CBC & Differential.  Procedure                               Abnormality         Status                     ---------                               -----------         ------                     CBC Auto Differential[[id number]]        Abnormal            Final result                 Please view results for these tests on the individual orders.       EKG:   No orders to display       Meds given in ED:   Medications   sodium chloride 0.9 % flush 10 mL (has no administration in time range)   cefTRIAXone (ROCEPHIN) 2,000 mg in sodium chloride 0.9 % 100 mL IVPB-VTB (has no " administration in time range)   HYDROmorphone (DILAUDID) injection 1 mg (1 mg Intravenous Given 2/26/24 1936)   ondansetron (ZOFRAN) injection 4 mg (4 mg Intravenous Given 2/26/24 1937)   HYDROmorphone (DILAUDID) injection 0.5 mg (0.5 mg Intravenous Given 2/26/24 2006)   Enoxaparin Sodium (LOVENOX) syringe 40 mg (40 mg Subcutaneous Given 2/26/24 2022)       Imaging results:  No radiology results for the last day    Ambulatory status:   - bed rest    Social issues:   Social History     Socioeconomic History    Marital status:    Tobacco Use    Smoking status: Never    Smokeless tobacco: Never   Substance and Sexual Activity    Alcohol use: Yes     Comment: social    Drug use: No    Sexual activity: Yes     Partners: Male     Birth control/protection: Surgical       Peripheral Neurovascular  Peripheral Neurovascular (Adult)  Peripheral Neurovascular WDL: .WDL except, neurovascular assessment lower  LLE Neurovascular Assessment  Temperature LLE: hot  Color LLE: red  Sensation LLE: tenderness present    Neuro Cognitive  Neuro Cognitive (Adult)  Cognitive/Neuro/Behavioral WDL: WDL    Learning  Learning Assessment (Adult)  Learning Readiness and Ability: no barriers identified    Respiratory  Respiratory WDL  Respiratory WDL: WDL    Abdominal Pain       Pain Assessments  Pain (Adult)  (0-10) Pain Rating: Rest: 9  (0-10) Pain Rating: Activity: 10    NIH Stroke Scale       Lisandra Marrufo RN  02/26/24 20:33 EST

## 2024-02-27 NOTE — ED PROVIDER NOTES
EMERGENCY DEPARTMENT ENCOUNTER  Room Number:  32/32  PCP: Hardeep Ames MD  Independent Historians: Patient,  at bedside      HPI:  Chief Complaint: had concerns including Leg Swelling.     A complete HPI/ROS/PMH/PSH/SH/FH are unobtainable due to:   Chronic or social conditions impacting patient care (Social Determinants of Health):       Context: Carolina Cooney is a 63 y.o. female with a medical history of chronic back pain, COPD who presents to the ED c/o acute left leg pain and swelling.  Symptoms have been ongoing for about 3 days and may be related to a fall from the toilet several days ago.  Patient does have a history of chronic low back pain and takes gabapentin.  She is trying to get in with pain management.  Over the last 3 days she has had fairly significant swelling of the left leg as well as pain down that leg.  Pain is worsened with standing and walking.  She denies history of pulmonary embolism or significant risk factors for PE including no recent surgery, prolonged travel or history of prior personal PE/DVT.  Patient denies chest pain.  Breathing is near baseline with history of some COPD.  Patient is a neck smoker.      Review of prior external notes (non-ED) -and- Review of prior external test results outside of this encounter:   I reviewed prior medical records including most recent spine surgery note from December of last year.  Patient has a history of spinal stenosis with neurogenic claudication.  Other chronic conditions include COPD with pulmonary actinomycosis, iron deficiency anemia, IBS and hypertension.      Prescription drug monitoring program review:         PAST MEDICAL HISTORY  Active Ambulatory Problems     Diagnosis Date Noted    Encounter for gynecological examination without abnormal finding 11/28/2016     Resolved Ambulatory Problems     Diagnosis Date Noted    No Resolved Ambulatory Problems     Past Medical History:   Diagnosis Date    Anxiety and depression      Arthritis     GERD (gastroesophageal reflux disease)     Hypertension     IBS (irritable bowel syndrome)          PAST SURGICAL HISTORY  Past Surgical History:   Procedure Laterality Date    BACK SURGERY       SECTION      GASTROSTOMY FEEDING TUBE INSERTION      LAPAROSCOPIC TUBAL LIGATION           FAMILY HISTORY  Family History   Problem Relation Age of Onset    Depression Mother     Osteoporosis Mother     Alcohol abuse Mother     Uterine cancer Mother     Depression Maternal Grandmother          SOCIAL HISTORY  Social History     Socioeconomic History    Marital status:    Tobacco Use    Smoking status: Never    Smokeless tobacco: Never   Substance and Sexual Activity    Alcohol use: Yes     Comment: social    Drug use: No    Sexual activity: Yes     Partners: Male     Birth control/protection: Surgical         ALLERGIES  Penicillins      REVIEW OF SYSTEMS  Review of Systems   Constitutional:  Negative for fever.   Respiratory:  Positive for shortness of breath (Chronic with COPD).    Cardiovascular:  Negative for chest pain.   Musculoskeletal:  Positive for back pain.        Left leg pain and swelling as per HPI   All other systems reviewed and are negative.    Included in HPI  All systems reviewed and negative except for those discussed in HPI.      PHYSICAL EXAM    I have reviewed the triage vital signs and nursing notes.    ED Triage Vitals   Temp Heart Rate Resp BP SpO2   24 1854 24 1854 24 1854 24 1904 24   98.3 °F (36.8 °C) 106 16 (!) 149/106 93 %      Temp src Heart Rate Source Patient Position BP Location FiO2 (%)   24 1854 24 -- -- --   Tympanic Monitor          Physical Exam  GENERAL: Alert female in moderate distress.  Triage vitals reviewed notable for temperature 98.3.  Initial blood pressure 149/106.  O2 sats 93% on room air and initial pulse was 106.  SKIN: Examination of the foot reveals some warmth and erythema on the lateral  foot going up towards the ankle consistent with some possible developing cellulitis.  HENT: Normocephalic, atraumatic  EYES: no scleral icterus  CV: regular rhythm, regular rate  RESPIRATORY: Coarse rhonchi with expiratory wheezes-O2 sats mid to lower 90s room air  ABDOMEN: soft, nontender, nondistended  MUSCULOSKELETAL: Moderate swelling of the left leg from the knee down to the foot.  She has diffuse tenderness to palpation throughout the foot.  Pedal pulses weakly palpable.  There are not signs of ischemia in the foot and capillary refill is less than 2 seconds  NEURO: alert, moves all extremities, follows commands      LAB RESULTS  Recent Results (from the past 24 hour(s))   D-dimer, Quantitative    Collection Time: 02/26/24  7:39 PM    Specimen: Blood   Result Value Ref Range    D-Dimer, Quantitative 2.04 (H) 0.00 - 0.61 MCGFEU/mL   CBC Auto Differential    Collection Time: 02/26/24  7:39 PM    Specimen: Blood   Result Value Ref Range    WBC 17.14 (H) 3.40 - 10.80 10*3/mm3    RBC 4.24 3.77 - 5.28 10*6/mm3    Hemoglobin 11.0 (L) 12.0 - 15.9 g/dL    Hematocrit 35.1 34.0 - 46.6 %    MCV 82.8 79.0 - 97.0 fL    MCH 25.9 (L) 26.6 - 33.0 pg    MCHC 31.3 (L) 31.5 - 35.7 g/dL    RDW 14.2 12.3 - 15.4 %    RDW-SD 41.5 37.0 - 54.0 fl    MPV 9.5 6.0 - 12.0 fL    Platelets 540 (H) 140 - 450 10*3/mm3    Neutrophil % 79.5 (H) 42.7 - 76.0 %    Lymphocyte % 9.3 (L) 19.6 - 45.3 %    Monocyte % 5.5 5.0 - 12.0 %    Eosinophil % 4.7 0.3 - 6.2 %    Basophil % 0.6 0.0 - 1.5 %    Immature Grans % 0.4 0.0 - 0.5 %    Neutrophils, Absolute 13.62 (H) 1.70 - 7.00 10*3/mm3    Lymphocytes, Absolute 1.60 0.70 - 3.10 10*3/mm3    Monocytes, Absolute 0.95 (H) 0.10 - 0.90 10*3/mm3    Eosinophils, Absolute 0.81 (H) 0.00 - 0.40 10*3/mm3    Basophils, Absolute 0.10 0.00 - 0.20 10*3/mm3    Immature Grans, Absolute 0.06 (H) 0.00 - 0.05 10*3/mm3    nRBC 0.0 0.0 - 0.2 /100 WBC   Comprehensive Metabolic Panel    Collection Time: 02/26/24  8:04 PM     Specimen: Blood   Result Value Ref Range    Glucose 103 (H) 65 - 99 mg/dL    BUN 23 8 - 23 mg/dL    Creatinine 0.57 0.57 - 1.00 mg/dL    Sodium 139 136 - 145 mmol/L    Potassium 4.2 3.5 - 5.2 mmol/L    Chloride 100 98 - 107 mmol/L    CO2 30.3 (H) 22.0 - 29.0 mmol/L    Calcium 9.3 8.6 - 10.5 mg/dL    Total Protein 7.9 6.0 - 8.5 g/dL    Albumin 3.7 3.5 - 5.2 g/dL    ALT (SGPT) 11 1 - 33 U/L    AST (SGOT) 17 1 - 32 U/L    Alkaline Phosphatase 104 39 - 117 U/L    Total Bilirubin <0.2 0.0 - 1.2 mg/dL    Globulin 4.2 gm/dL    A/G Ratio 0.9 g/dL    BUN/Creatinine Ratio 40.4 (H) 7.0 - 25.0    Anion Gap 8.7 5.0 - 15.0 mmol/L    eGFR 103.5 >60.0 mL/min/1.73         RADIOLOGY  No Radiology Exams Resulted Within Past 24 Hours      MEDICATIONS GIVEN IN ER  Medications   sodium chloride 0.9 % flush 10 mL (has no administration in time range)   cefTRIAXone (ROCEPHIN) 2,000 mg in sodium chloride 0.9 % 100 mL IVPB-VTB (has no administration in time range)   HYDROmorphone (DILAUDID) injection 1 mg (1 mg Intravenous Given 2/26/24 1936)   ondansetron (ZOFRAN) injection 4 mg (4 mg Intravenous Given 2/26/24 1937)   HYDROmorphone (DILAUDID) injection 0.5 mg (0.5 mg Intravenous Given 2/26/24 2006)   Enoxaparin Sodium (LOVENOX) syringe 40 mg (40 mg Subcutaneous Given 2/26/24 2022)         ORDERS PLACED DURING THIS VISIT:  Orders Placed This Encounter   Procedures    Blood Culture - Blood,    Blood Culture - Blood,    Comprehensive Metabolic Panel    D-dimer, Quantitative    CBC Auto Differential    Lactic Acid, Plasma    [facility] (on-call MD unless specified)    Insert Peripheral IV    Initiate Observation Status    CBC & Differential         OUTPATIENT MEDICATION MANAGEMENT:  Current Facility-Administered Medications Ordered in Epic   Medication Dose Route Frequency Provider Last Rate Last Admin    cefTRIAXone (ROCEPHIN) 2,000 mg in sodium chloride 0.9 % 100 mL IVPB-VTB  2,000 mg Intravenous Once Jonnathan, Randy LORENZO MD        sodium chloride 0.9  % flush 10 mL  10 mL Intravenous PRN Randy De Santiago MD         Current Outpatient Medications Ordered in Epic   Medication Sig Dispense Refill    amitriptyline (ELAVIL) 50 MG tablet Take 50 mg by mouth.      conjugated estrogens (PREMARIN) 0.625 MG/GM vaginal cream Insert  into the vagina Daily. For 2 weeks and then 2-3 times a week as needed 1 each 1    omeprazole (priLOSEC) 40 MG capsule       promethazine (PHENERGAN) 25 MG tablet            PROCEDURES  Procedures            PROGRESS, DATA ANALYSIS, CONSULTS, AND MEDICAL DECISION MAKING  All labs have been independently interpreted by me.  All radiology studies have been reviewed by me. All EKG's have been independently viewed and interpreted by me.  Discussion below represents my analysis of pertinent findings related to patient's condition, differential diagnosis, treatment plan and final disposition.    Differential diagnosis includes but is not limited to DVT, peripheral edema, cellulitis, lumbar radiculopathy.      ED Course as of 02/26/24 2031 Mon Feb 26, 2024 1950 [id number] female with history of chronic back pain presents with pretty significant left leg swelling and left leg pain.      On exam she is afebrile and well-appearing but does have pain with movement of the left leg.  There is pretty significant swelling that extends from the knee down towards the foot.  There is some warmth and redness and swelling particularly at the left lateral and inferior fusion of the foot that could be consistent with some developing cellulitis.    MDM-etiology of swelling and pain unclear.  Certainly would have to consider DVT although she does not have significant risk factors for same.  Pain could be related to lumbar radiculopathy but that should not cause this degree of swelling.  This could be related to peripheral edema but is unclear why it would be so unilateral. [DB]   2009 Patient with elevated D-dimer of 2.04 worrisome for possible DVT.  Unable to  do Doppler at this time at night as they have already gone home.    Will go ahead and anticoagulate with Lovenox pending ultrasound tomorrow.    Given elevated white count and some findings of early cellulitis we will go ahead and start some IV Rocephin. [DB]   2019 I did take a picture and add it to her clinical record of the left foot which shows the swelling and redness. [DB]      ED Course User Index  [DB] Randy De Santiago MD             AS OF 20:31 EST VITALS:    BP - 137/84  HR - 94  TEMP - 98.3 °F (36.8 °C) (Tympanic)  O2 SATS - 95%    COMPLEXITY OF CARE  Complicated patient with history of lumbar disc disease, COPD presents with pretty significant swelling of the left lower extremity as well as pain in that leg.    Labs showed significant leukocytosis with white count of 17 K.  This is worrisome for underlying cellulitis and patient was treated with IV Rocephin, blood culture sent.    Also underlying concern for DVT.  Patient has pretty significant swelling and elevated D-dimer of 2.0.  Unfortunately we do not have capability for ultrasound at this time of the night and will go ahead and give a shot of Rocephin to bridge until we can get a Doppler ultrasound tomorrow.    Patient had a fair amount of pain in the leg and was treated with some IV Dilaudid.  Patient tended to keep the leg hanging in a dependent fashion and is reluctant to elevate it to horizontal.  After some pain medicine she is now able to keep the leg horizontal and this should help with the swelling.    I spoke with Dr. Rodrigues who admit on behalf of [facility].      DIAGNOSIS  Final diagnoses:   Cellulitis of left lower extremity   Elevated d-dimer   Chronic midline low back pain with left-sided sciatica         DISPOSITION  ED Disposition       ED Disposition   Decision to Admit    Condition   --    Comment   Level of Care: Med/Surg [1]   Diagnosis: Cellulitis of left lower extremity [312362]   Admitting Physician: RAJI RODRIGUES [[id number]]   Attending  Physician: RAJI BORDEN [[id number]]                  Please note that portions of this document were completed with a voice recognition program.    Note Disclaimer: At [facility], we believe that sharing information builds trust and better relationships. You are receiving this note because you recently visited [facility]. It is possible you will see health information before a provider has talked with you about it. This kind of information can be easy to misunderstand. To help you fully understand what it means for your health, we urge you to discuss this note with your provider.         Randy De Santiago MD  02/26/24 2031

## 2024-02-28 ENCOUNTER — APPOINTMENT (OUTPATIENT)
Dept: CARDIOLOGY | Facility: HOSPITAL | Age: 64
End: 2024-02-28
Payer: COMMERCIAL

## 2024-02-28 ENCOUNTER — APPOINTMENT (OUTPATIENT)
Dept: GENERAL RADIOLOGY | Facility: HOSPITAL | Age: 64
End: 2024-02-28
Payer: COMMERCIAL

## 2024-02-28 LAB
ALBUMIN SERPL-MCNC: 3.5 G/DL (ref 3.5–5.2)
ALBUMIN/GLOB SERPL: 0.8 G/DL
ALP SERPL-CCNC: 92 U/L (ref 39–117)
ALT SERPL W P-5'-P-CCNC: 11 U/L (ref 1–33)
ANION GAP SERPL CALCULATED.3IONS-SCNC: 15.4 MMOL/L (ref 5–15)
AST SERPL-CCNC: 16 U/L (ref 1–32)
BASOPHILS # BLD AUTO: 0.08 10*3/MM3 (ref 0–0.2)
BASOPHILS NFR BLD AUTO: 0.4 % (ref 0–1.5)
BH CV LOW VAS LEFT GASTRONEMIUS VESSEL: 1
BH CV LOW VAS LEFT MID FEMORAL SPONT: 1
BH CV LOWER VASCULAR LEFT COMMON FEMORAL AUGMENT: NORMAL
BH CV LOWER VASCULAR LEFT COMMON FEMORAL COMPETENT: NORMAL
BH CV LOWER VASCULAR LEFT COMMON FEMORAL COMPRESS: NORMAL
BH CV LOWER VASCULAR LEFT COMMON FEMORAL PHASIC: NORMAL
BH CV LOWER VASCULAR LEFT COMMON FEMORAL SPONT: NORMAL
BH CV LOWER VASCULAR LEFT DISTAL FEMORAL COMPRESS: NORMAL
BH CV LOWER VASCULAR LEFT GASTRONEMIUS COMPRESS: NORMAL
BH CV LOWER VASCULAR LEFT GASTRONEMIUS THROMBUS: NORMAL
BH CV LOWER VASCULAR LEFT GREATER SAPH AK COMPRESS: NORMAL
BH CV LOWER VASCULAR LEFT GREATER SAPH BK COMPRESS: NORMAL
BH CV LOWER VASCULAR LEFT LESSER SAPH COMPRESS: NORMAL
BH CV LOWER VASCULAR LEFT MID FEMORAL AUGMENT: NORMAL
BH CV LOWER VASCULAR LEFT MID FEMORAL COMPETENT: NORMAL
BH CV LOWER VASCULAR LEFT MID FEMORAL COMPRESS: NORMAL
BH CV LOWER VASCULAR LEFT MID FEMORAL PHASIC: NORMAL
BH CV LOWER VASCULAR LEFT MID FEMORAL SPONT: NORMAL
BH CV LOWER VASCULAR LEFT MID FEMORAL THROMBUS: NORMAL
BH CV LOWER VASCULAR LEFT PERONEAL COMPRESS: NORMAL
BH CV LOWER VASCULAR LEFT POPLITEAL AUGMENT: NORMAL
BH CV LOWER VASCULAR LEFT POPLITEAL COMPETENT: NORMAL
BH CV LOWER VASCULAR LEFT POPLITEAL COMPRESS: NORMAL
BH CV LOWER VASCULAR LEFT POPLITEAL PHASIC: NORMAL
BH CV LOWER VASCULAR LEFT POPLITEAL SPONT: NORMAL
BH CV LOWER VASCULAR LEFT POSTERIOR TIBIAL COMPRESS: NORMAL
BH CV LOWER VASCULAR LEFT PROFUNDA FEMORAL COMPRESS: NORMAL
BH CV LOWER VASCULAR LEFT PROXIMAL FEMORAL COMPRESS: NORMAL
BH CV LOWER VASCULAR LEFT SAPHENOFEMORAL JUNCTION COMPRESS: NORMAL
BH CV LOWER VASCULAR RIGHT COMMON FEMORAL AUGMENT: NORMAL
BH CV LOWER VASCULAR RIGHT COMMON FEMORAL COMPETENT: NORMAL
BH CV LOWER VASCULAR RIGHT COMMON FEMORAL COMPRESS: NORMAL
BH CV LOWER VASCULAR RIGHT COMMON FEMORAL PHASIC: NORMAL
BH CV LOWER VASCULAR RIGHT COMMON FEMORAL SPONT: NORMAL
BH CV LOWER VASCULAR RIGHT DISTAL FEMORAL COMPRESS: NORMAL
BH CV LOWER VASCULAR RIGHT GASTRONEMIUS COMPRESS: NORMAL
BH CV LOWER VASCULAR RIGHT GREATER SAPH AK COMPRESS: NORMAL
BH CV LOWER VASCULAR RIGHT GREATER SAPH BK COMPRESS: NORMAL
BH CV LOWER VASCULAR RIGHT LESSER SAPH COMPRESS: NORMAL
BH CV LOWER VASCULAR RIGHT MID FEMORAL AUGMENT: NORMAL
BH CV LOWER VASCULAR RIGHT MID FEMORAL COMPETENT: NORMAL
BH CV LOWER VASCULAR RIGHT MID FEMORAL COMPRESS: NORMAL
BH CV LOWER VASCULAR RIGHT MID FEMORAL PHASIC: NORMAL
BH CV LOWER VASCULAR RIGHT MID FEMORAL SPONT: NORMAL
BH CV LOWER VASCULAR RIGHT PERONEAL COMPRESS: NORMAL
BH CV LOWER VASCULAR RIGHT POPLITEAL AUGMENT: NORMAL
BH CV LOWER VASCULAR RIGHT POPLITEAL COMPETENT: NORMAL
BH CV LOWER VASCULAR RIGHT POPLITEAL COMPRESS: NORMAL
BH CV LOWER VASCULAR RIGHT POPLITEAL PHASIC: NORMAL
BH CV LOWER VASCULAR RIGHT POPLITEAL SPONT: NORMAL
BH CV LOWER VASCULAR RIGHT POSTERIOR TIBIAL COMPRESS: NORMAL
BH CV LOWER VASCULAR RIGHT PROFUNDA FEMORAL COMPRESS: NORMAL
BH CV LOWER VASCULAR RIGHT PROXIMAL FEMORAL COMPRESS: NORMAL
BH CV LOWER VASCULAR RIGHT SAPHENOFEMORAL JUNCTION COMPRESS: NORMAL
BH CV VAS PRELIMINARY FINDINGS SCRIPTING: 1
BILIRUB SERPL-MCNC: 0.3 MG/DL (ref 0–1.2)
BUN SERPL-MCNC: 22 MG/DL (ref 8–23)
BUN/CREAT SERPL: 33.3 (ref 7–25)
CALCIUM SPEC-SCNC: 9.1 MG/DL (ref 8.6–10.5)
CHLORIDE SERPL-SCNC: 93 MMOL/L (ref 98–107)
CHOLEST SERPL-MCNC: 153 MG/DL (ref 0–200)
CO2 SERPL-SCNC: 28.6 MMOL/L (ref 22–29)
CREAT SERPL-MCNC: 0.66 MG/DL (ref 0.57–1)
CRP SERPL-MCNC: 7.36 MG/DL (ref 0–0.5)
DEPRECATED RDW RBC AUTO: 39 FL (ref 37–54)
EGFRCR SERPLBLD CKD-EPI 2021: 99.9 ML/MIN/1.73
EOSINOPHIL # BLD AUTO: 0.07 10*3/MM3 (ref 0–0.4)
EOSINOPHIL NFR BLD AUTO: 0.3 % (ref 0.3–6.2)
ERYTHROCYTE [DISTWIDTH] IN BLOOD BY AUTOMATED COUNT: 13.9 % (ref 12.3–15.4)
ERYTHROCYTE [SEDIMENTATION RATE] IN BLOOD: 88 MM/HR (ref 0–30)
GLOBULIN UR ELPH-MCNC: 4.4 GM/DL
GLUCOSE SERPL-MCNC: 143 MG/DL (ref 65–99)
HBA1C MFR BLD: 6 % (ref 4.8–5.6)
HCT VFR BLD AUTO: 36.5 % (ref 34–46.6)
HDLC SERPL-MCNC: 46 MG/DL (ref 40–60)
HGB BLD-MCNC: 11.5 G/DL (ref 12–15.9)
IMM GRANULOCYTES # BLD AUTO: 0.12 10*3/MM3 (ref 0–0.05)
IMM GRANULOCYTES NFR BLD AUTO: 0.5 % (ref 0–0.5)
LDLC SERPL CALC-MCNC: 90 MG/DL (ref 0–100)
LDLC/HDLC SERPL: 1.93 {RATIO}
LITHIUM SERPL-SCNC: <0.1 MMOL/L (ref 0.6–1.2)
LYMPHOCYTES # BLD AUTO: 1.64 10*3/MM3 (ref 0.7–3.1)
LYMPHOCYTES NFR BLD AUTO: 7.2 % (ref 19.6–45.3)
MCH RBC QN AUTO: 25 PG (ref 26.6–33)
MCHC RBC AUTO-ENTMCNC: 31.5 G/DL (ref 31.5–35.7)
MCV RBC AUTO: 79.3 FL (ref 79–97)
MONOCYTES # BLD AUTO: 1.1 10*3/MM3 (ref 0.1–0.9)
MONOCYTES NFR BLD AUTO: 4.8 % (ref 5–12)
MRSA DNA SPEC QL NAA+PROBE: NORMAL
NEUTROPHILS NFR BLD AUTO: 19.83 10*3/MM3 (ref 1.7–7)
NEUTROPHILS NFR BLD AUTO: 86.8 % (ref 42.7–76)
NRBC BLD AUTO-RTO: 0 /100 WBC (ref 0–0.2)
NT-PROBNP SERPL-MCNC: 837 PG/ML (ref 0–900)
PLATELET # BLD AUTO: 672 10*3/MM3 (ref 140–450)
PMV BLD AUTO: 9.3 FL (ref 6–12)
POTASSIUM SERPL-SCNC: 3.1 MMOL/L (ref 3.5–5.2)
PROT SERPL-MCNC: 7.9 G/DL (ref 6–8.5)
RBC # BLD AUTO: 4.6 10*6/MM3 (ref 3.77–5.28)
SODIUM SERPL-SCNC: 137 MMOL/L (ref 136–145)
TRIGL SERPL-MCNC: 92 MG/DL (ref 0–150)
TSH SERPL DL<=0.05 MIU/L-ACNC: 1.1 UIU/ML (ref 0.27–4.2)
VANCOMYCIN TROUGH SERPL-MCNC: 8.1 MCG/ML (ref 5–20)
VLDLC SERPL-MCNC: 17 MG/DL (ref 5–40)
WBC NRBC COR # BLD AUTO: 22.84 10*3/MM3 (ref 3.4–10.8)

## 2024-02-28 PROCEDURE — 85652 RBC SED RATE AUTOMATED: CPT | Performed by: HOSPITALIST

## 2024-02-28 PROCEDURE — 80053 COMPREHEN METABOLIC PANEL: CPT | Performed by: HOSPITALIST

## 2024-02-28 PROCEDURE — 80178 ASSAY OF LITHIUM: CPT | Performed by: HOSPITALIST

## 2024-02-28 PROCEDURE — 96361 HYDRATE IV INFUSION ADD-ON: CPT

## 2024-02-28 PROCEDURE — 96372 THER/PROPH/DIAG INJ SC/IM: CPT

## 2024-02-28 PROCEDURE — 25010000002 SODIUM CHLORIDE 0.9 % WITH KCL 20 MEQ 20-0.9 MEQ/L-% SOLUTION: Performed by: HOSPITALIST

## 2024-02-28 PROCEDURE — 73630 X-RAY EXAM OF FOOT: CPT

## 2024-02-28 PROCEDURE — 25010000002 VANCOMYCIN PER 500 MG: Performed by: INTERNAL MEDICINE

## 2024-02-28 PROCEDURE — 85025 COMPLETE CBC W/AUTO DIFF WBC: CPT | Performed by: HOSPITALIST

## 2024-02-28 PROCEDURE — 25010000002 CEFAZOLIN IN DEXTROSE 2-4 GM/100ML-% SOLUTION: Performed by: HOSPITALIST

## 2024-02-28 PROCEDURE — 25010000002 ONDANSETRON PER 1 MG: Performed by: HOSPITALIST

## 2024-02-28 PROCEDURE — 25810000003 SODIUM CHLORIDE 0.9 % SOLUTION 250 ML FLEX CONT: Performed by: INTERNAL MEDICINE

## 2024-02-28 PROCEDURE — 63710000001 PROMETHAZINE PER 12.5 MG: Performed by: PHYSICIAN ASSISTANT

## 2024-02-28 PROCEDURE — 97162 PT EVAL MOD COMPLEX 30 MIN: CPT

## 2024-02-28 PROCEDURE — 80061 LIPID PANEL: CPT | Performed by: HOSPITALIST

## 2024-02-28 PROCEDURE — 83880 ASSAY OF NATRIURETIC PEPTIDE: CPT | Performed by: HOSPITALIST

## 2024-02-28 PROCEDURE — 93970 EXTREMITY STUDY: CPT

## 2024-02-28 PROCEDURE — 99214 OFFICE O/P EST MOD 30 MIN: CPT | Performed by: PHYSICIAN ASSISTANT

## 2024-02-28 PROCEDURE — 86140 C-REACTIVE PROTEIN: CPT | Performed by: HOSPITALIST

## 2024-02-28 PROCEDURE — G0378 HOSPITAL OBSERVATION PER HR: HCPCS

## 2024-02-28 PROCEDURE — 25010000002 ONDANSETRON PER 1 MG: Performed by: PHYSICIAN ASSISTANT

## 2024-02-28 PROCEDURE — 25010000002 VANCOMYCIN 750 MG RECONSTITUTED SOLUTION 1 EACH VIAL: Performed by: INTERNAL MEDICINE

## 2024-02-28 PROCEDURE — 96375 TX/PRO/DX INJ NEW DRUG ADDON: CPT

## 2024-02-28 PROCEDURE — 25010000002 ENOXAPARIN PER 10 MG: Performed by: HOSPITALIST

## 2024-02-28 PROCEDURE — 97530 THERAPEUTIC ACTIVITIES: CPT

## 2024-02-28 PROCEDURE — 87641 MR-STAPH DNA AMP PROBE: CPT | Performed by: INTERNAL MEDICINE

## 2024-02-28 PROCEDURE — 83036 HEMOGLOBIN GLYCOSYLATED A1C: CPT | Performed by: HOSPITALIST

## 2024-02-28 PROCEDURE — 80202 ASSAY OF VANCOMYCIN: CPT | Performed by: INTERNAL MEDICINE

## 2024-02-28 PROCEDURE — 84443 ASSAY THYROID STIM HORMONE: CPT | Performed by: HOSPITALIST

## 2024-02-28 PROCEDURE — 25010000002 KCL (0.149%) IN NACL 20-0.9 MEQ/L-% SOLUTION: Performed by: HOSPITALIST

## 2024-02-28 PROCEDURE — 25010000002 HYDROMORPHONE PER 4 MG: Performed by: HOSPITALIST

## 2024-02-28 PROCEDURE — 96376 TX/PRO/DX INJ SAME DRUG ADON: CPT

## 2024-02-28 RX ORDER — AMLODIPINE BESYLATE 10 MG/1
10 TABLET ORAL
Status: DISCONTINUED | OUTPATIENT
Start: 2024-02-29 | End: 2024-02-29 | Stop reason: HOSPADM

## 2024-02-28 RX ORDER — ONDANSETRON 2 MG/ML
4 INJECTION INTRAMUSCULAR; INTRAVENOUS EVERY 6 HOURS
Status: DISCONTINUED | OUTPATIENT
Start: 2024-02-28 | End: 2024-02-29 | Stop reason: HOSPADM

## 2024-02-28 RX ORDER — POTASSIUM CHLORIDE 750 MG/1
10 TABLET, FILM COATED, EXTENDED RELEASE ORAL DAILY
Status: DISCONTINUED | OUTPATIENT
Start: 2024-02-28 | End: 2024-02-28

## 2024-02-28 RX ORDER — PROMETHAZINE HYDROCHLORIDE 12.5 MG/1
12.5 TABLET ORAL EVERY 8 HOURS PRN
Status: DISCONTINUED | OUTPATIENT
Start: 2024-02-28 | End: 2024-02-29

## 2024-02-28 RX ORDER — POTASSIUM CHLORIDE 750 MG/1
40 TABLET, FILM COATED, EXTENDED RELEASE ORAL ONCE
Status: COMPLETED | OUTPATIENT
Start: 2024-02-28 | End: 2024-02-28

## 2024-02-28 RX ORDER — HYDRALAZINE HYDROCHLORIDE 20 MG/ML
10 INJECTION INTRAMUSCULAR; INTRAVENOUS EVERY 4 HOURS PRN
Status: DISCONTINUED | OUTPATIENT
Start: 2024-02-28 | End: 2024-02-29

## 2024-02-28 RX ORDER — SODIUM CHLORIDE AND POTASSIUM CHLORIDE 150; 900 MG/100ML; MG/100ML
75 INJECTION, SOLUTION INTRAVENOUS CONTINUOUS
Status: DISCONTINUED | OUTPATIENT
Start: 2024-02-28 | End: 2024-02-29

## 2024-02-28 RX ADMIN — HYDROMORPHONE HYDROCHLORIDE 0.5 MG: 1 INJECTION, SOLUTION INTRAMUSCULAR; INTRAVENOUS; SUBCUTANEOUS at 19:50

## 2024-02-28 RX ADMIN — ENOXAPARIN SODIUM 40 MG: 100 INJECTION SUBCUTANEOUS at 23:03

## 2024-02-28 RX ADMIN — PANTOPRAZOLE SODIUM 40 MG: 40 INJECTION, POWDER, FOR SOLUTION INTRAVENOUS at 09:52

## 2024-02-28 RX ADMIN — ENOXAPARIN SODIUM 40 MG: 100 INJECTION SUBCUTANEOUS at 12:26

## 2024-02-28 RX ADMIN — CEFAZOLIN SODIUM 2000 MG: 2 INJECTION, SOLUTION INTRAVENOUS at 00:21

## 2024-02-28 RX ADMIN — METOPROLOL TARTRATE 25 MG: 25 TABLET, FILM COATED ORAL at 20:33

## 2024-02-28 RX ADMIN — VANCOMYCIN HYDROCHLORIDE 500 MG: 500 INJECTION, POWDER, LYOPHILIZED, FOR SOLUTION INTRAVENOUS at 06:22

## 2024-02-28 RX ADMIN — HYDROMORPHONE HYDROCHLORIDE 0.5 MG: 1 INJECTION, SOLUTION INTRAMUSCULAR; INTRAVENOUS; SUBCUTANEOUS at 02:30

## 2024-02-28 RX ADMIN — ONDANSETRON 4 MG: 2 INJECTION INTRAMUSCULAR; INTRAVENOUS at 05:42

## 2024-02-28 RX ADMIN — SODIUM CHLORIDE AND POTASSIUM CHLORIDE 75 ML/HR: 9; 1.49 INJECTION, SOLUTION INTRAVENOUS at 23:08

## 2024-02-28 RX ADMIN — AMITRIPTYLINE HYDROCHLORIDE 50 MG: 50 TABLET, FILM COATED ORAL at 20:33

## 2024-02-28 RX ADMIN — CEFAZOLIN SODIUM 2000 MG: 2 INJECTION, SOLUTION INTRAVENOUS at 23:03

## 2024-02-28 RX ADMIN — PANTOPRAZOLE SODIUM 40 MG: 40 INJECTION, POWDER, FOR SOLUTION INTRAVENOUS at 20:33

## 2024-02-28 RX ADMIN — HYDROMORPHONE HYDROCHLORIDE 0.5 MG: 1 INJECTION, SOLUTION INTRAMUSCULAR; INTRAVENOUS; SUBCUTANEOUS at 15:20

## 2024-02-28 RX ADMIN — LITHIUM CARBONATE 150 MG: 150 CAPSULE, GELATIN COATED ORAL at 20:33

## 2024-02-28 RX ADMIN — POTASSIUM CHLORIDE 40 MEQ: 750 TABLET, EXTENDED RELEASE ORAL at 15:20

## 2024-02-28 RX ADMIN — CEFAZOLIN SODIUM 2000 MG: 2 INJECTION, SOLUTION INTRAVENOUS at 15:26

## 2024-02-28 RX ADMIN — CEFAZOLIN SODIUM 2000 MG: 2 INJECTION, SOLUTION INTRAVENOUS at 09:54

## 2024-02-28 RX ADMIN — ONDANSETRON 4 MG: 2 INJECTION INTRAMUSCULAR; INTRAVENOUS at 23:03

## 2024-02-28 RX ADMIN — ONDANSETRON 4 MG: 2 INJECTION INTRAMUSCULAR; INTRAVENOUS at 02:02

## 2024-02-28 RX ADMIN — HYDROMORPHONE HYDROCHLORIDE 0.5 MG: 1 INJECTION, SOLUTION INTRAMUSCULAR; INTRAVENOUS; SUBCUTANEOUS at 11:13

## 2024-02-28 RX ADMIN — ONDANSETRON 4 MG: 2 INJECTION INTRAMUSCULAR; INTRAVENOUS at 17:13

## 2024-02-28 RX ADMIN — METOPROLOL TARTRATE 2.5 MG: 5 INJECTION INTRAVENOUS at 11:13

## 2024-02-28 RX ADMIN — VANCOMYCIN HYDROCHLORIDE 750 MG: 750 INJECTION, POWDER, LYOPHILIZED, FOR SOLUTION INTRAVENOUS at 18:42

## 2024-02-28 RX ADMIN — HYDROMORPHONE HYDROCHLORIDE 0.5 MG: 1 INJECTION, SOLUTION INTRAMUSCULAR; INTRAVENOUS; SUBCUTANEOUS at 06:32

## 2024-02-28 RX ADMIN — SODIUM CHLORIDE AND POTASSIUM CHLORIDE 75 ML/HR: 9; 1.49 INJECTION, SOLUTION INTRAVENOUS at 11:19

## 2024-02-28 RX ADMIN — PROMETHAZINE HYDROCHLORIDE 12.5 MG: 12.5 TABLET ORAL at 11:13

## 2024-02-28 NOTE — PROGRESS NOTES
[facility], [place], operated by [facility]  Inpatient Progress Note    Reason for Follow Up:  N/V    Subjective     Interval History:   Pt just had duplex of LE, which is positive for acute L lower extremity DVT in mid femoral and gastrocnemius.    She continues to have vomiting. No GI bleed. Still very nauseous.       Current Facility-Administered Medications:     amitriptyline (ELAVIL) tablet 50 mg, 50 mg, Oral, Nightly, Lawson Rodrigues MD    amLODIPine (NORVASC) tablet 5 mg, 5 mg, Oral, Q24H, Lawson Rodrigues MD, 5 mg at 02/27/24 1804    ceFAZolin in dextrose (ANCEF) IVPB solution 2,000 mg, 2,000 mg, Intravenous, Q8H, Lawson Rodrigues MD, 2,000 mg at 02/28/24 0021    DULoxetine (CYMBALTA) DR capsule 30 mg, 30 mg, Oral, Daily, Lawson Rodrigues MD    Enoxaparin Sodium (LOVENOX) syringe 40 mg, 1 mg/kg, Subcutaneous, Q12H, Lawson Rodrigues MD, 40 mg at 02/27/24 0929    HYDROmorphone (DILAUDID) injection 0.5 mg, 0.5 mg, Intravenous, Q4H PRN, Lawson Rodrigues MD, 0.5 mg at 02/28/24 0632    ipratropium-albuterol (DUO-NEB) nebulizer solution 3 mL, 3 mL, Nebulization, Q4H PRN, Lawson Rodrigues MD    lithium carbonate capsule 150 mg, 150 mg, Oral, BID, Lawson Rodrigues MD    methocarbamol (ROBAXIN) tablet 500 mg, 500 mg, Oral, Q6H PRN, Lawson Rodrigues MD    metoprolol tartrate (LOPRESSOR) injection 2.5 mg, 2.5 mg, Intravenous, Q6H PRN, Lawson Rodrigues MD    metoprolol tartrate (LOPRESSOR) tablet 12.5 mg, 12.5 mg, Oral, Q12H, Lawson Rodrigues MD    montelukast (SINGULAIR) tablet 10 mg, 10 mg, Oral, Daily, Lawson Rodrigues MD    ondansetron (ZOFRAN) injection 4 mg, 4 mg, Intravenous, Q4H PRN, Lawson Rodrigues MD, 4 mg at 02/28/24 0542    pantoprazole (PROTONIX) injection 40 mg, 40 mg, Intravenous, Q12H, Gianfranco Jeffries PA-C, 40 mg at 02/27/24 2044    Pharmacy to dose vancomycin, , Does not apply, Continuous PRN, Bia Casas MD    [COMPLETED] Insert Peripheral IV, , , Once **AND** sodium chloride 0.9 % flush 10 mL, 10 mL, Intravenous, PRN, Randy De Santiago MD     sodium chloride 0.9 % with KCl 20 mEq/L infusion, 75 mL/hr, Intravenous, Continuous, Lawson Rodrigues MD    tiotropium (SPIRIVA RESPIMAT) 2.5 mcg/act aerosol solution inhaler, 1 puff, Inhalation, Daily - RT, Lawson Rodrigues MD, 1 puff at 02/27/24 1214    vancomycin (VANCOCIN) 500 mg in sodium chloride 0.9 % 100 mL IVPB-VTB, 500 mg, Intravenous, Q12H, Bia Casas MD, 500 mg at 02/28/24 0622        Objective     Vital Signs  Temp:  [97.1 °F (36.2 °C)-99.8 °F (37.7 °C)] 97.8 °F (36.6 °C)  Heart Rate:  [] 121  Resp:  [16-20] 20  BP: (147-165)/() 147/106  Body mass index is 16.99 kg/m².    Intake/Output Summary (Last 24 hours) at 2/28/2024 0950  Last data filed at 2/28/2024 0622  Gross per 24 hour   Intake --   Output 1450 ml   Net -1450 ml     No intake/output data recorded.     Physical Exam:   General: patient awake, alert and cooperative   Eyes: Normal lids and lashes, no scleral icterus   Abdomen: soft, nontender, nondistended; normal bowel sounds      Results Review:     I reviewed the patient's new clinical results.    Results from last 7 days   Lab Units 02/28/24  0506 02/27/24  0511 02/26/24  1939   WBC 10*3/mm3 22.84* 15.10* 17.14*   HEMOGLOBIN g/dL 11.5* 11.2* 11.0*   HEMATOCRIT % 36.5 35.8 35.1   PLATELETS 10*3/mm3 672* 566* 540*     Results from last 7 days   Lab Units 02/28/24  0506 02/27/24  0511 02/26/24 2004   SODIUM mmol/L 137 140 139   POTASSIUM mmol/L 3.1* 4.0 4.2   CHLORIDE mmol/L 93* 100 100   CO2 mmol/L 28.6 26.6 30.3*   BUN mg/dL 22 16 23   CREATININE mg/dL 0.66 0.55* 0.57   CALCIUM mg/dL 9.1 9.1 9.3   BILIRUBIN mg/dL 0.3  --  <0.2   ALK PHOS U/L 92  --  104   ALT (SGPT) U/L 11  --  11   AST (SGOT) U/L 16  --  17   GLUCOSE mg/dL 143* 107* 103*         Lab Results   Lab Value Date/Time    LIPASE 6 05/22/2023 1356       Radiology:  CT Abdomen Pelvis With Contrast   Final Result      XR Foot 3+ View Left    (Results Pending)       Assessment & Plan     Active Hospital Problems    Diagnosis      **Cellulitis of left lower extremity          Assessment:   Cellulitis  R LE DVT  Coffee ground emesis- likely due to known esophagitis. Her n/v likely exacerbated by gastroparesis in setting of infection  Leukocytosis  LA grade D Esophagitis as seen on EGD 02/16/2024 at [facility]     Plan:   Pt w/ persistent vomiting, but no longer coffee ground emesis. Recommend scheduled zofran + phenergan PRN.  Can consider reglan pending above.   Will defer EGD at this time given stable H/H and no ongoing GI bleed at this time. She has known hx of gastroparesis and severe esophagitis which is contributing to her current GI symptoms.   Can have clear liquid diet as tolerated    I discussed the patients findings and my recommendations with patient.         Gianfranco Jeffries PA-C  [facility]  [address] [place] [address]  Office: [phone]

## 2024-02-28 NOTE — PLAN OF CARE
Goal Outcome Evaluation:  Plan of Care Reviewed With: patient, spouse           Outcome Evaluation: Pt is 64 yo female admitted to [facility] for acute LLE cellulitis. PMH significant for HTN, IBS, bipolar. Patient lives with spouse, independent prior to admission, using rwx for past few days due to soreness in LLE. patient agreeable to therapy, anxious to get up out of bed, performed bed mobility with SBA, sit to stand with CGA, ambulated in hallway with rwx and antalgic gait with CGA. Patient demonstrates impairments consisting of generalized weakness, decreased activity tolerance and would benefit from skilled PT. d/c plans are home with spouse, recommend use of rwx for now with nsg staff to mobilize up to chair and to bathroom, spoke with RN.      Anticipated Discharge Disposition (PT): home with assist

## 2024-02-28 NOTE — PROGRESS NOTES
"Daily progress note    Primary care physician      Subjective  Awake and alert and complaint of nausea vomiting but no abdominal pain diarrhea.  Patient has had coffee-ground emesis yesterday.  Patient pain well-controlled with medications.  Patient family at bedside.  Patient does not look in any distress at the time of examination.    History of present illness  63-year-old white female with history of chronic hypoxic respiratory failure on home oxygen gastroparesis anxiety depression degenerative disc disease chronic pain syndrome presented to [facility] emergency room with left leg pain swelling and redness for last 4 days which is getting worse.  Patient denies any fall trauma injury.  Patient denies any fever chills chest pain increase shortness of breath palpitation.  Patient evaluated in ER found to have left lower extremity colitis and also found to have elevated D-dimer need to rule out DVT admitted for management.  At the time of examination she is in a lot of pain asking for pain medication.  Patient give me detailed history and her  also contributed.     REVIEW OF SYSTEMS  Constitutional:  Negative for fever.   Respiratory: Negative for shortness of breath  Cardiovascular:  Negative for chest pain.   Musculoskeletal:  Positive for back pain.        Left leg pain and swelling as per HPI   All other systems reviewed and are negative.     PHYSICAL EXAM   Blood pressure (!) 171/106, pulse 106, temperature 97.1 °F (36.2 °C), temperature source Oral, resp. rate 20, height 160 cm (62.99\"), weight 43.5 kg (95 lb 14.4 oz), SpO2 100%.    GENERAL: Awake and alert and  no distress  SKIN: Examination of the foot reveals some warmth and erythema on the lateral foot going up towards the ankle consistent with some possible developing cellulitis.  HENT: Normocephalic, atraumatic  EYES: no scleral icterus  CV: regular rhythm, regular rate  RESPIRATORY: Normal effort and moving air " bilaterally  ABDOMEN: soft, nontender, nondistended bowel sounds positive  MUSCULOSKELETAL: Moderate swelling of the left leg from the knee down to the foot.    NEURO: alert, moves all extremities, follows commands     LAB RESULTS  Lab Results (last 24 hours)       Procedure Component Value Units Date/Time    MRSA Screen, PCR (Inpatient) - Swab, Nares [[id number]]  (Normal) Collected: 02/28/24 0623    Specimen: Swab from Nares Updated: 02/28/24 0759     MRSA PCR No MRSA Detected    Narrative:      The negative predictive value of this diagnostic test is high and should only be used to consider de-escalating anti-MRSA therapy. A positive result may indicate colonization with MRSA and must be correlated clinically.    Comprehensive Metabolic Panel [[id number]]  (Abnormal) Collected: 02/28/24 0506    Specimen: Blood Updated: 02/28/24 0548     Glucose 143 mg/dL      BUN 22 mg/dL      Creatinine 0.66 mg/dL      Sodium 137 mmol/L      Potassium 3.1 mmol/L      Chloride 93 mmol/L      CO2 28.6 mmol/L      Calcium 9.1 mg/dL      Total Protein 7.9 g/dL      Albumin 3.5 g/dL      ALT (SGPT) 11 U/L      AST (SGOT) 16 U/L      Alkaline Phosphatase 92 U/L      Total Bilirubin 0.3 mg/dL      Globulin 4.4 gm/dL      A/G Ratio 0.8 g/dL      BUN/Creatinine Ratio 33.3     Anion Gap 15.4 mmol/L      eGFR 99.9 mL/min/1.73     Narrative:      GFR Normal >60  Chronic Kidney Disease <60  Kidney Failure <15      BNP [[id number]]  (Normal) Collected: 02/28/24 0506    Specimen: Blood Updated: 02/28/24 0544     proBNP 837.0 pg/mL     Narrative:      This assay is used as an aid in the diagnosis of individuals suspected of having heart failure. It can be used as an aid in the diagnosis of acute decompensated heart failure (ADHF) in patients presenting with signs and symptoms of ADHF to the emergency department (ED). In addition, NT-proBNP of <300 pg/mL indicates ADHF is not likely.    Age Range Result Interpretation  NT-proBNP Concentration  (pg/mL:      <50             Positive            >450                   Gray                 300-450                    Negative             <300    50-75           Positive            >900                  Gray                300-900                  Negative            <300      >75             Positive            >1800                  Gray                300-1800                  Negative            <300    TSH [[id number]]  (Normal) Collected: 02/28/24 0506    Specimen: Blood Updated: 02/28/24 0544     TSH 1.100 uIU/mL     Lithium Level [[id number]]  (Abnormal) Collected: 02/28/24 0506    Specimen: Blood Updated: 02/28/24 0540     Lithium <0.1 mmol/L     Lipid Panel [[id number]] Collected: 02/28/24 0506    Specimen: Blood Updated: 02/28/24 0538     Total Cholesterol 153 mg/dL      Triglycerides 92 mg/dL      HDL Cholesterol 46 mg/dL      LDL Cholesterol  90 mg/dL      VLDL Cholesterol 17 mg/dL      LDL/HDL Ratio 1.93    Narrative:      Cholesterol Reference Ranges  (U.S. Department of Health and Human Services ATP III Classifications)    Desirable          <200 mg/dL  Borderline High    200-239 mg/dL  High Risk          >240 mg/dL      Triglyceride Reference Ranges  (U.S. Department of Health and Human Services ATP III Classifications)    Normal           <150 mg/dL  Borderline High  150-199 mg/dL  High             200-499 mg/dL  Very High        >500 mg/dL    HDL Reference Ranges  (U.S. Department of Health and Human Services ATP III Classifications)    Low     <40 mg/dl (major risk factor for CHD)  High    >60 mg/dl ('negative' risk factor for CHD)        LDL Reference Ranges  (U.S. Department of Health and Human Services ATP III Classifications)    Optimal          <100 mg/dL  Near Optimal     100-129 mg/dL  Borderline High  130-159 mg/dL  High             160-189 mg/dL  Very High        >189 mg/dL    C-reactive Protein [[id number]]  (Abnormal) Collected: 02/28/24 0506    Specimen: Blood Updated: 02/28/24 0538      C-Reactive Protein 7.36 mg/dL     Hemoglobin A1c [[id number]]  (Abnormal) Collected: 02/28/24 0506    Specimen: Blood Updated: 02/28/24 0532     Hemoglobin A1C 6.00 %     Narrative:      Hemoglobin A1C Ranges:    Increased Risk for Diabetes  5.7% to 6.4%  Diabetes                     >= 6.5%  Diabetic Goal                < 7.0%    Sedimentation Rate [[id number]]  (Abnormal) Collected: 02/28/24 0506    Specimen: Blood Updated: 02/28/24 0532     Sed Rate 88 mm/hr     CBC & Differential [[id number]]  (Abnormal) Collected: 02/28/24 0506    Specimen: Blood Updated: 02/28/24 0524    Narrative:      The following orders were created for panel order CBC & Differential.  Procedure                               Abnormality         Status                     ---------                               -----------         ------                     CBC Auto Differential[[id number]]        Abnormal            Final result                 Please view results for these tests on the individual orders.    CBC Auto Differential [[id number]]  (Abnormal) Collected: 02/28/24 0506    Specimen: Blood Updated: 02/28/24 0524     WBC 22.84 10*3/mm3      RBC 4.60 10*6/mm3      Hemoglobin 11.5 g/dL      Hematocrit 36.5 %      MCV 79.3 fL      MCH 25.0 pg      MCHC 31.5 g/dL      RDW 13.9 %      RDW-SD 39.0 fl      MPV 9.3 fL      Platelets 672 10*3/mm3      Neutrophil % 86.8 %      Lymphocyte % 7.2 %      Monocyte % 4.8 %      Eosinophil % 0.3 %      Basophil % 0.4 %      Immature Grans % 0.5 %      Neutrophils, Absolute 19.83 10*3/mm3      Lymphocytes, Absolute 1.64 10*3/mm3      Monocytes, Absolute 1.10 10*3/mm3      Eosinophils, Absolute 0.07 10*3/mm3      Basophils, Absolute 0.08 10*3/mm3      Immature Grans, Absolute 0.12 10*3/mm3      nRBC 0.0 /100 WBC     Blood Culture - Blood, Arm, Left [[id number]]  (Normal) Collected: 02/26/24 2059    Specimen: Blood from Arm, Left Updated: 02/27/24 2116     Blood Culture No growth at 24 hours           Imaging Results (Last 24 Hours)       Procedure Component Value Units Date/Time    XR Foot 3+ View Left [[id number]] Resulted: 02/28/24 1043     Updated: 02/28/24 1114    CT Abdomen Pelvis With Contrast [[id number]] Collected: 02/27/24 2238     Updated: 02/27/24 2249    Narrative:      CT ABDOMEN PELVIS W CONTRAST-     Radiation dose reduction techniques were utilized, including automated  exposure control and exposure modulation based on body size.     Clinical: Nausea, vomiting     COMPARISON: None     FINDINGS:  1. There is a large amount of fecal material demonstrated within the  colon. This consistent with constipation. Dense fecal material is seen  within the rectum where there is diffuse wall thickening, suspect  impaction. Due to the paucity of intra-abdominal fat as well as small  bowel loops about the cecum, the appendix cannot be seen with certainty.  The small bowel appears within normal limits. The stomach is collapsed.  There is a small hiatal hernia.     2. Typical small focus fatty infiltration left lobe of the liver. The  liver is otherwise satisfactory in appearance. The gallbladder is  normal. No biliary duct dilatation. The pancreas is satisfactory in  appearance. The spleen is normal in size. Both adrenal glands have a  satisfactory appearance. The right kidney is normal. There is a 2 cm  left renal cyst. No calculus or obstructive uropathy on the right or  left. Diameter of the abdominal aorta is within normal limits. The  bladder is moderately distended otherwise satisfactory in appearance.  The uterus is small, size appropriate for age, no adnexal abnormality  seen.     3. No free air nor free intraperitoneal fluid. There is bronchial wall  thickening at both lung bases suggesting bronchitis which could either  be acute or chronic. There are also micronodules demonstrated at both  lung bases and some of these are arranged in a pattern consistent with  bronchiolar infectious/inflammatory  process. Remainder is unremarkable.        This report was finalized on 2/27/2024 10:46 PM by Dr. Ang Salomon M.D on Workstation: [id number]             Interpretation Summary    Acute left lower extremity deep vein thrombosis noted in the mid femoral and gastrocnemius.    All other veins appeared normal bilaterally.    Current Facility-Administered Medications:     amitriptyline (ELAVIL) tablet 50 mg, 50 mg, Oral, Nightly, Lawson Rodrigues MD    amLODIPine (NORVASC) tablet 5 mg, 5 mg, Oral, Q24H, Lawson Rodrigues MD, 5 mg at 02/27/24 1804    ceFAZolin in dextrose (ANCEF) IVPB solution 2,000 mg, 2,000 mg, Intravenous, Q8H, Lawson Rodrigues MD, 2,000 mg at 02/28/24 0954    DULoxetine (CYMBALTA) DR capsule 30 mg, 30 mg, Oral, Daily, Lawson Rodrigues MD    Enoxaparin Sodium (LOVENOX) syringe 40 mg, 1 mg/kg, Subcutaneous, Q12H, Lawson Rodrigues MD, 40 mg at 02/28/24 1226    HYDROmorphone (DILAUDID) injection 0.5 mg, 0.5 mg, Intravenous, Q4H PRN, Lawson Rodrigues MD, 0.5 mg at 02/28/24 1113    ipratropium-albuterol (DUO-NEB) nebulizer solution 3 mL, 3 mL, Nebulization, Q4H PRN, Lawson Rodrigues MD    lithium carbonate capsule 150 mg, 150 mg, Oral, BID, Lawson Rodrigues MD    methocarbamol (ROBAXIN) tablet 500 mg, 500 mg, Oral, Q6H PRN, Lawson Rodrigues MD    metoprolol tartrate (LOPRESSOR) injection 2.5 mg, 2.5 mg, Intravenous, Q6H PRN, Lawson Rodrigues MD, 2.5 mg at 02/28/24 1113    metoprolol tartrate (LOPRESSOR) tablet 12.5 mg, 12.5 mg, Oral, Q12H, Lawson Rodrigues MD    montelukast (SINGULAIR) tablet 10 mg, 10 mg, Oral, Daily, Lawson Rodrigues MD    ondansetron (ZOFRAN) injection 4 mg, 4 mg, Intravenous, Q6H, Gianfranco Jeffries PA-C    pantoprazole (PROTONIX) injection 40 mg, 40 mg, Intravenous, Q12H, Gianfranco Jeffries PA-C, 40 mg at 02/28/24 0952    Pharmacy to dose vancomycin, , Does not apply, Continuous PRN, Bia Casas MD    promethazine (PHENERGAN) tablet 12.5 mg, 12.5 mg, Oral, Q8H PRN, Gianfranco Jeffries PA-C, 12.5 mg at 02/28/24 1117     [COMPLETED] Insert Peripheral IV, , , Once **AND** sodium chloride 0.9 % flush 10 mL, 10 mL, Intravenous, PRN, Randy De Santiago MD    sodium chloride 0.9 % with KCl 20 mEq/L infusion, 75 mL/hr, Intravenous, Continuous, Raji Rodrigues MD, Last Rate: 75 mL/hr at 02/28/24 1119, 75 mL/hr at 02/28/24 1119    tiotropium (SPIRIVA RESPIMAT) 2.5 mcg/act aerosol solution inhaler, 1 puff, Inhalation, Daily - RT, Raji Rodrigues MD, 1 puff at 02/27/24 1214    vancomycin (VANCOCIN) 500 mg in sodium chloride 0.9 % 100 mL IVPB-VTB, 500 mg, Intravenous, Q12H, AugustoBia MD, 500 mg at 02/28/24 0622     ASSESSMENT  Acute left lower extremity cellulitis  Acute left lower extremity DVT  Persistent nausea vomiting with history of esophagitis and gastroparesis  Chronic hypoxic respiratory failure  Hypertension  Irritable bowel syndrome  Degenerative disc disease  Bipolar disorder   Chronic pain syndrome  Gastroesophageal reflux disease    PLAN  CPM  Continue IV antibiotics   Continue Lovenox  Pain management  Continue home medications  Stress ulcer DVT prophylaxis  GI consult appreciated  Infectious disease to follow patient  Supportive care  Discussed with family nursing staff  Follow closely further recommendation current hospital course    RAJI RODRIGUES MD    Copied text in this note has been reviewed and is accurate as of 02/28/24

## 2024-02-28 NOTE — PROGRESS NOTES
"[facility] Clinical Pharmacy Services: Vancomycin Pharmacokinetic Initial Consult Note    Carolina Cooney is a 63 y.o. female who is on day 1 of pharmacy to dose vancomycin.    Indication: Skin and Soft Tissue  Consulting Provider: Dr. Casas  Planned Duration of Therapy: 7 days  Loading Dose Ordered or Given: 750 mg on 2/27 at 2300  MRSA PCR performed: ordered 2/27, result: pending  Culture/Source:   2/26 blood cx x 2 pending  Target: -600 mg/L.hr   Other Antimicrobials: cefazolin    Vitals/Labs  Ht: 160 cm (62.99\"); Wt: 43.5 kg (95 lb 14.4 oz)  Temp Readings from Last 1 Encounters:   02/27/24 97.7 °F (36.5 °C) (Oral)    Estimated Creatinine Clearance: 73.8 mL/min (A) (by C-G formula based on SCr of 0.55 mg/dL (L)).        Results from last 7 days   Lab Units 02/27/24  0511 02/26/24 2004 02/26/24  1939   CREATININE mg/dL 0.55* 0.57  --    WBC 10*3/mm3 15.10*  --  17.14*     Assessment/Plan:    Vancomycin Dose:   500 mg (11.5mg/kg) IV every  12  hours, the predicted AUC for this dose is slightly below target range but with such low body weight don't want to do the recommended 750mg q12 (17.2mg/kg) as that is a hefty mg/kg dose  Predictive AUC level for the dose ordered is 326 mg/L.hr, which is slightly below the target of 400-600 mg/L.hr  Vanc Trough has been ordered for 2/28 at 1700     Pharmacy will follow patient's kidney function and will adjust doses and obtain levels as necessary. Thank you for involving pharmacy in this patient's care. Please contact pharmacy with any questions or concerns.                           Ivania Elaine, PharmD  Clinical Pharmacist    "

## 2024-02-28 NOTE — CONSULTS
CONSULT NOTE    Infectious Diseases - Bia Maldonado MD  [facility]       Patient Identification:  Name: Carolina Cooney  Age: 63 y.o.  Sex: female  :  1960  MRN: [id number]             Date of Consultation: 2024      Primary Care Physician: Hardeep Ames MD                               Requesting Physician: Dr. 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  Patient is currently on oral amoxicillin and currently scheduled to have a follow-up with [facility] next month with repeat CT scan of the chest to decide upon antibiotic therapy continuation or discontinuation.  Patient has been doing well otherwise until 3 days prior to coming to the hospital when she developed pain and discomfort in her left foot after apparently a fall when she stood up from the toilet.  Since then her left foot has becoming gradually swollen painful and red with difficulty in walking.  Patient did not have any fever and chills.  This redness and swelling has occurred despite being on oral amoxicillin.  Since her issues with actinomyces and recurrent pneumonias and empyema patient did have some back surgery in December of last year.  Blood cultures were drawn, patient was noted to have white blood cell count of 17,000.  At the time of my evaluation most of the information was gathered from the patient's  at the bedside is patient's most of the somnolent and lethargic but able to be aroused and answer simple questions only to get back to sleep.  Examination did not reveal warmth  tenderness and swelling of the lateral aspect of the left foot with preserved range of motion of the left ankle and no ankle joint tenderness.  Impression:  This presentation and above context is concerning for:  1-traumatic cellulitis of the left foot with symptoms starting after a reported injury rule out underlying nondisplaced fracture of the left foot with inflammation occurring while on Augmentin suggesting possibility of other pathogens not covered by Augmentin including possibility of MRSA and gram-negative rods.  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  5-history of hypertension  6-other diagnoses per primary team.    Recommendations/Discussions:  At this juncture I would recommend checking her for MRSA colonization and adding vancomycin to her regimen for traumatic cellulitis of the left foot while cefazolin is being continued.  While receiving antibiotic therapy for cellulitis hold her Augmentin.  Would recommend x-ray of the left foot  Keep an eye on her mental status to make sure that her condition is not deteriorating due to CO2 retention.  Management of other issues per primary team.            Past Medical History:  Past Medical History:   Diagnosis Date    Anxiety and depression     Arthritis     GERD (gastroesophageal reflux disease)     Hypertension     IBS (irritable bowel syndrome)      Past Surgical History:  Past Surgical History:   Procedure Laterality Date    BACK SURGERY       SECTION      GASTROSTOMY FEEDING TUBE INSERTION      LAPAROSCOPIC TUBAL LIGATION        Home Meds:  Medications Prior to Admission   Medication Sig Dispense Refill Last Dose    amoxicillin-clavulanate (AUGMENTIN) 875-125 MG per tablet Take  1 tablet by mouth 2 (Two) Times a Day.       cyclobenzaprine (FLEXERIL) 5 MG tablet Take 1 tablet by mouth 3 (Three) Times a Day As Needed for Muscle Spasms.       DULoxetine (CYMBALTA) 30 MG capsule Take 1 capsule by mouth Daily.       ipratropium-albuterol (COMBIVENT RESPIMAT)  MCG/ACT inhaler Inhale 1 puff 2 (Two) Times a Day.       lithium carbonate 150 MG capsule Take 1 capsule by mouth 2 (Two) Times a Day.       methocarbamol (ROBAXIN) 500 MG tablet Take 1 tablet by mouth Daily.       montelukast (SINGULAIR) 10 MG tablet Take 1 tablet by mouth Daily.       pantoprazole (PROTONIX) 40 MG EC tablet Take 1 tablet by mouth Daily.       promethazine (PHENERGAN) 25 MG tablet        tiotropium bromide monohydrate (SPIRIVA RESPIMAT) 2.5 MCG/ACT aerosol solution inhaler Inhale 1 puff Daily.       vitamin D (ERGOCALCIFEROL) 1.25 MG (95384 UT) capsule capsule Take 1 capsule by mouth 1 (One) Time Per Week. Takes every Monday       amitriptyline (ELAVIL) 50 MG tablet Take 50 mg by mouth.       conjugated estrogens (PREMARIN) 0.625 MG/GM vaginal cream Insert  into the vagina Daily. For 2 weeks and then 2-3 times a week as needed 1 each 1     omeprazole (priLOSEC) 40 MG capsule         Current Meds:     Current Facility-Administered Medications:     amitriptyline (ELAVIL) tablet 50 mg, 50 mg, Oral, Nightly, Lawson Rodriuges MD    amLODIPine (NORVASC) tablet 5 mg, 5 mg, Oral, Q24H, Lawson Rodrigues MD, 5 mg at 02/27/24 1804    ceFAZolin in dextrose (ANCEF) IVPB solution 2,000 mg, 2,000 mg, Intravenous, Q8H, Lawson Rodrigues MD, 2,000 mg at 02/27/24 1449    DULoxetine (CYMBALTA) DR capsule 30 mg, 30 mg, Oral, Daily, Lawson Rodrigues MD    Enoxaparin Sodium (LOVENOX) syringe 40 mg, 1 mg/kg, Subcutaneous, Q12H, Lawson Rodrigues MD, 40 mg at 02/27/24 0929    HYDROmorphone (DILAUDID) injection 0.5 mg, 0.5 mg, Intravenous, Q4H PRN, Lawson Rodrigues MD, 0.5 mg at 02/27/24 1802    ipratropium-albuterol (DUO-NEB) nebulizer solution 3 mL, 3 mL,  "Nebulization, Q4H PRN, Lawson Rodrigues MD    lithium carbonate capsule 150 mg, 150 mg, Oral, BID, Lawson Rodrigues MD    methocarbamol (ROBAXIN) tablet 500 mg, 500 mg, Oral, Q6H PRN, Lawson Rodrigues MD    metoprolol tartrate (LOPRESSOR) tablet 12.5 mg, 12.5 mg, Oral, Q12H, Lawson Rodrigues MD    montelukast (SINGULAIR) tablet 10 mg, 10 mg, Oral, Daily, Lawson Rodrigues MD    ondansetron (ZOFRAN) injection 4 mg, 4 mg, Intravenous, Q4H PRN, Lawson Rodrigues MD    pantoprazole (PROTONIX) injection 40 mg, 40 mg, Intravenous, Q12H, Gianfranco Jeffries PA-C    [COMPLETED] Insert Peripheral IV, , , Once **AND** sodium chloride 0.9 % flush 10 mL, 10 mL, Intravenous, PRN, TabernashRandy martinez MD    tiotropium (SPIRIVA RESPIMAT) 2.5 mcg/act aerosol solution inhaler, 1 puff, Inhalation, Daily - RT, Lawson Rodrigues MD, 1 puff at 02/27/24 1214  Allergies:  Allergies   Allergen Reactions    Penicillins Hives     Social History:   Social History     Tobacco Use    Smoking status: Never    Smokeless tobacco: Never   Substance Use Topics    Alcohol use: Yes     Comment: social      Family History:  Family History   Problem Relation Age of Onset    Depression Mother     Osteoporosis Mother     Alcohol abuse Mother     Uterine cancer Mother     Depression Maternal Grandmother           Review of Systems  See history of present illness and past medical history.    Left foot pain but overall review system is limited because of somnolence.      Vitals:   BP (!) 165/108 (BP Location: Left arm, Patient Position: Lying)   Pulse 101   Temp 99.2 °F (37.3 °C) (Oral)   Resp 20   Ht 160 cm (62.99\")   Wt 43.5 kg (95 lb 14.4 oz)   SpO2 92%   BMI 16.99 kg/m²   I/O:   Intake/Output Summary (Last 24 hours) at 2/27/2024 1919  Last data filed at 2/27/2024 1847  Gross per 24 hour   Intake --   Output 700 ml   Net -700 ml     Exam:  Patient is examined using the personal protective equipment as per guidelines from infection control for this particular patient as enacted.  " Hand washing was performed before and after patient interaction.  General Appearance:  Ill-appearing somnolent female   Head:    Normocephalic, without obvious abnormality, atraumatic   Eyes:    PERRL, conjunctivae/corneas clear, EOM's intact, both eyes   Ears:    Normal external ear canals, both ears   Nose:   Nares normal, septum midline, mucosa normal, no drainage    or sinus tenderness   Throat:   Lips, tongue, gums normal; oral mucosa pink and moist   Neck:   Supple, symmetrical, trachea midline, no adenopathy;     thyroid:  no enlargement/tenderness/nodules; no carotid    bruit or JVD   Back:     Symmetric, no curvature, ROM normal, no CVA tenderness   Lungs:   Decreased breath sounds at the bases   Chest Wall:    No tenderness or deformity    Heart:  S1-S2 regular   Abdomen:   Soft slightly distended mild generalized tenderness   Extremities: Cellulitic erythematous changes on the lateral SPECT of the left foot noted range of motion of ankle intact.  No obvious swelling or deformity noted.   Pulses:   Pulses palpable in all extremities; symmetric all extremities   Skin: Erythematous changes on the lateral aspect of the left foot   Neurologic: Somnolent and lethargic       Data Review:    I reviewed the patient's new clinical results.  Results from last 7 days   Lab Units 02/27/24  0511 02/26/24  1939   WBC 10*3/mm3 15.10* 17.14*   HEMOGLOBIN g/dL 11.2* 11.0*   PLATELETS 10*3/mm3 566* 540*     Results from last 7 days   Lab Units 02/27/24  0511 02/26/24 2004   SODIUM mmol/L 140 139   POTASSIUM mmol/L 4.0 4.2   CHLORIDE mmol/L 100 100   CO2 mmol/L 26.6 30.3*   BUN mg/dL 16 23   CREATININE mg/dL 0.55* 0.57   CALCIUM mg/dL 9.1 9.3   GLUCOSE mg/dL 107* 103*     Microbiology Results (last 10 days)       ** No results found for the last 240 hours. **              Assessment:  Active Hospital Problems    Diagnosis  POA    **Cellulitis of left lower extremity [L03.116]  Yes      Resolved Hospital Problems   No  resolved problems to display.         Plan:  See above  Bia Casas MD   2/27/2024  19:19 EST    Parts of this note may be an electronic transcription/translation of spoken language to printed text using the Dragon dictation system.

## 2024-02-28 NOTE — PROGRESS NOTES
[facility] Clinical Pharmacy Services: Vancomycin Level Monitoring Note    Carolina Cooney is a 63 y.o. female who is on day 1/7 of pharmacy to dose vancomycin for Skin and Soft Tissue.    Estimated Creatinine Clearance: 61.5 mL/min (by C-G formula based on SCr of 0.66 mg/dL).    Current Vanc Dose: 500 mg IV every  12  hours  Results from last 7 days   Lab Units 02/28/24  1713   VANCOMYCIN TR mcg/mL 8.10       Plan/Assessment  Trough resulted as above at 8.10 mg/dL , not a SS trough but [employer] predicting AUC to be below range of 400-600.  Predicted AUC at current dose:361 mg/L.hr  Will increase dosing regimen to vancomycin 750 mg IV q12h to provide predicted AUC of 534 mg/L.hr    Next Level Date and Time: Vanc Trough on 2/29 @ 1800. BMP scheduled with AM labs.     Pharmacy is continuing to monitor and will adjust as needed.    Mauricio Tyson, [facility]  Clinical Pharmacist

## 2024-02-28 NOTE — PROGRESS NOTES
"Nutrition Services    Patient Name:  Carolina Cooney  YOB: 1960  MRN: [id number]  Admit Date:  2024    Assessment Date:  24    Summary: Nutrition assessment triggered by low BMI - 16.99    63 y.o. female admitted with L leg swelling, redness and pain.  Started with coffee ground emesis yesterday.  EGD at [facility]  with grade D esophagitis.  Gastroparesis.  H/H stable today so EGD cancelled.  Refused NG tube placement yesterday.  Positive for DVT.    Currently on clear liquid diet, 0% x 3 meals per chart PO data.    Per care everywhere review, possible weight gain noted since November (88 lb) and December (87 lb).  Last documented weight prior to this admission was 90 lb on .    RD to follow up to interview once diet has been advanced.    CLINICAL NUTRITION ASSESSMENT      Reason for Assessment BMI     Diagnosis/Problem   Cellulitis of LLE   Medical/Surgical History Past Medical History:   Diagnosis Date    Anxiety and depression     Arthritis     GERD (gastroesophageal reflux disease)     Hypertension     IBS (irritable bowel syndrome)        Past Surgical History:   Procedure Laterality Date    BACK SURGERY       SECTION      GASTROSTOMY FEEDING TUBE INSERTION      LAPAROSCOPIC TUBAL LIGATION          Anthropometrics        Current Height  Current Weight  BMI kg/m2 Height: 160 cm (62.99\")  Weight: 43.5 kg (95 lb 14.4 oz) (24)  Body mass index is 16.99 kg/m².   Adjusted BMI (if applicable)    BMI Category Underweight (18.4 or below)   Ideal Body Weight (IBW) 115 lb (52.3 kg)   Usual Body Weight (UBW) 87-95 lb (care everywhere review)   Weight Trend Gain   Weight History Wt Readings from Last 30 Encounters:   24 43.5 kg (95 lb 14.4 oz)   24 42.5 kg (93 lb 11.2 oz)   16 1040 47.2 kg (104 lb)      --  Estimated/Assessed Needs        Current Weight  Weight: 43.5 kg (95 lb 14.4 oz) (24)       Energy Requirements    Weight for " "Calculation 95 lb (43.5 kg)   Method for Estimation  35-40 kcal/kg   EST Needs (kcal/day) 2651-2209       Protein Requirements    Weight for Calculation 95 lb (43.5 kg)   EST Protein Needs (g/kg) 1.5 - 2.0 gm/kg   EST Daily Needs (g/day) 65-87       Fluid Requirements     Method for Estimation 1 mL/kcal    EST Needs (mL/day)      Labs       Pertinent Labs    Results from last 7 days   Lab Units 02/28/24  0506 02/27/24  0511 02/26/24 2004   SODIUM mmol/L 137 140 139   POTASSIUM mmol/L 3.1* 4.0 4.2   CHLORIDE mmol/L 93* 100 100   CO2 mmol/L 28.6 26.6 30.3*   BUN mg/dL 22 16 23   CREATININE mg/dL 0.66 0.55* 0.57   CALCIUM mg/dL 9.1 9.1 9.3   BILIRUBIN mg/dL 0.3  --  <0.2   ALK PHOS U/L 92  --  104   ALT (SGPT) U/L 11  --  11   AST (SGOT) U/L 16  --  17   GLUCOSE mg/dL 143* 107* 103*     Results from last 7 days   Lab Units 02/28/24  0506   HEMOGLOBIN g/dL 11.5*   HEMATOCRIT % 36.5   WBC 10*3/mm3 22.84*   TRIGLYCERIDES mg/dL 92   ALBUMIN g/dL 3.5     Results from last 7 days   Lab Units 02/28/24  0506 02/27/24  0511 02/26/24  1939   PLATELETS 10*3/mm3 672* 566* 540*     No results found for: \"COVID19\"  Lab Results   Component Value Date    HGBA1C 6.00 (H) 02/28/2024          Medications           Scheduled Medications amitriptyline, 50 mg, Oral, Nightly  amLODIPine, 5 mg, Oral, Q24H  ceFAZolin, 2,000 mg, Intravenous, Q8H  DULoxetine, 30 mg, Oral, Daily  enoxaparin, 1 mg/kg, Subcutaneous, Q12H  lithium carbonate, 150 mg, Oral, BID  metoprolol tartrate, 12.5 mg, Oral, Q12H  montelukast, 10 mg, Oral, Daily  ondansetron, 4 mg, Intravenous, Q6H  pantoprazole, 40 mg, Intravenous, Q12H  tiotropium bromide monohydrate, 1 puff, Inhalation, Daily - RT  vancomycin, 500 mg, Intravenous, Q12H       Infusions Pharmacy to dose vancomycin,   sodium chloride 0.9 % with KCl 20 mEq, 75 mL/hr, Last Rate: 75 mL/hr (02/28/24 1119)       PRN Medications   HYDROmorphone    ipratropium-albuterol    methocarbamol    metoprolol tartrate    " Pharmacy to dose vancomycin    promethazine    [COMPLETED] Insert Peripheral IV **AND** sodium chloride     Physical Findings          General Findings alert, oriented, on oxygen therapy, underweight   Oral/Mouth Cavity dental caries, tooth or teeth missing   Edema  lower extremity , 2+ (mild)   Gastrointestinal non-distended , last bowel movement: 2/25   Skin  skin intact   Tubes/Drains/Lines none   NFPE Other: RD to follow up to perform   --  Current Nutrition Orders & Evaluation of Intake       Oral Nutrition     Food Allergies NKFA   Current PO Diet Diet: Liquid Diets; Clear Liquid; Fluid Consistency: Thin (IDDSI 0)   Supplement n/a   PO Evaluation     % PO Intake 0% x 3 meals    Factors Affecting Intake: altered GI function, nausea, vomiting   --  PES STATEMENT / NUTRITION DIAGNOSIS      Nutrition Dx Problem  Problem: Inadequate Oral Intake  Etiology: Medical Diagnosis - coffee ground emesis, N/V, gastroparesis, esophagitis    Signs/Symptoms: Clear Liquid Diet and Report/Observation     NUTRITION INTERVENTION / PLAN OF CARE      Intervention Goal(s) Maintain nutrition status, Reduce/improve symptoms, Meet estimated needs, Disease management/therapy, Establish PO intake, Tolerate PO , Increase intake, Advance diet, and Appropriate weight gain         RD Intervention/Action Await initiation/advancement of PO diet, Continue to monitor, and Care plan reviewed   --      Prescription/Orders:       PO Diet       Supplements       Enteral Nutrition       Parenteral Nutrition    New Prescription Ordered? No changes at this time   --      Monitor/Evaluation Per protocol, I&O, PO intake, Pertinent labs, Weight, GI status, Symptoms   Discharge Plan/Needs Pending clinical course   --    RD to follow per protocol.      Electronically signed by:  Hayley Simpson RD  02/28/24 11:49 EST

## 2024-02-28 NOTE — THERAPY EVALUATION
Patient Name: Carolina Cooney  : 1960    MRN: [id number]                              Today's Date: 2024       Admit Date: 2024    Visit Dx:     ICD-10-CM ICD-9-CM   1. Cellulitis of left lower extremity  L03.116 682.6   2. Elevated d-dimer  R79.89 790.92   3. Chronic midline low back pain with left-sided sciatica  M54.42 724.2    G89.29 724.3     338.29     Patient Active Problem List   Diagnosis    Encounter for gynecological examination without abnormal finding    Cellulitis of left lower extremity     Past Medical History:   Diagnosis Date    Anxiety and depression     Arthritis     GERD (gastroesophageal reflux disease)     Hypertension     IBS (irritable bowel syndrome)      Past Surgical History:   Procedure Laterality Date    BACK SURGERY       SECTION      GASTROSTOMY FEEDING TUBE INSERTION      LAPAROSCOPIC TUBAL LIGATION        General Information       Row Name 24 1459          Physical Therapy Time and Intention    Document Type evaluation  -EM     Mode of Treatment individual therapy;physical therapy  -EM       Row Name 24 1459          General Information    Patient Profile Reviewed yes  -EM     Prior Level of Function independent:  -EM     Existing Precautions/Restrictions fall  -EM       Row Name 24 1459          Living Environment    People in Home spouse  -EM       Row Name 24 1459          Cognition    Orientation Status (Cognition) oriented x 3  -EM       Row Name 24 1459          Safety Issues, Functional Mobility    Impairments Affecting Function (Mobility) endurance/activity tolerance;strength;pain  -EM               User Key  (r) = Recorded By, (t) = Taken By, (c) = Cosigned By      Initials Name Provider Type    EM Sophie Quispe PT Physical Therapist                   Mobility       Row Name 24 1459          Bed Mobility    Bed Mobility supine-sit  -EM     Supine-Sit [place] (Bed Mobility) standby assist  -EM      "Assistive Device (Bed Mobility) head of bed elevated  -EM       Row Name 02/28/24 1459          Sit-Stand Transfer    Sit-Stand [place] (Transfers) contact guard  -EM     Assistive Device (Sit-Stand Transfers) walker, front-wheeled  -EM       Row Name 02/28/24 1459          Gait/Stairs (Locomotion)    [place] Level (Gait) contact guard  -EM     Assistive Device (Gait) walker, front-wheeled  -EM     Distance in Feet (Gait) 150  -EM     Deviations/Abnormal Patterns (Gait) antalgic;gait speed decreased  -EM     Left Sided Gait Deviations heel strike decreased  -EM               User Key  (r) = Recorded By, (t) = Taken By, (c) = Cosigned By      Initials Name Provider Type    EM Sophie Quispe PT Physical Therapist                   Obj/Interventions       Row Name 02/28/24 1500          Range of Motion Comprehensive    General Range of Motion no range of motion deficits identified  -EM       Row Name 02/28/24 1500          Strength Comprehensive (MMT)    General Manual Muscle Testing (MMT) Assessment other (see comments)  -EM     Comment, General Manual Muscle Testing (MMT) Assessment no focal deficits identified  -EM       Row Name 02/28/24 1500          Balance    Balance Assessment sitting static balance;sitting dynamic balance;standing static balance;standing dynamic balance  -EM     Static Sitting Balance standby assist  -EM     Dynamic Sitting Balance standby assist  -EM     Position, Sitting Balance sitting edge of bed  -EM     Static Standing Balance contact guard  -EM     Dynamic Standing Balance contact guard  -EM     Position/Device Used, Standing Balance walker, rolling  -EM       Row Name 02/28/24 1500          Sensory Assessment (Somatosensory)    Sensory Assessment (Somatosensory) other (see comments)  pt reports \"a little\" numbness in LLE  -EM               User Key  (r) = Recorded By, (t) = Taken By, (c) = Cosigned By      Initials Name Provider Type    EM Sophie Quispe PT " "Physical Therapist                   Goals/Plan       Row Name 02/28/24 1504          Bed Mobility Goal 1 (PT)    Activity/Assistive Device (Bed Mobility Goal 1, PT) bed mobility activities, all  -EM     [place] Level/Cues Needed (Bed Mobility Goal 1, PT) modified independence  -EM     Time Frame (Bed Mobility Goal 1, PT) 1 week  -EM       Row Name 02/28/24 1504          Transfer Goal 1 (PT)    Activity/Assistive Device (Transfer Goal 1, PT) transfers, all;walker, rolling  -EM     [place] Level/Cues Needed (Transfer Goal 1, PT) supervision required  -EM     Time Frame (Transfer Goal 1, PT) 1 week  -EM       Row Name 02/28/24 1504          Gait Training Goal 1 (PT)    Activity/Assistive Device (Gait Training Goal 1, PT) gait (walking locomotion);walker, rolling  -EM     [place] Level (Gait Training Goal 1, PT) standby assist  -EM     Distance (Gait Training Goal 1, PT) 150  -EM     Time Frame (Gait Training Goal 1, PT) 1 week  -EM       Row Name 02/28/24 1502          Therapy Assessment/Plan (PT)    Planned Therapy Interventions (PT) bed mobility training;gait training;home exercise program;patient/family education;transfer training  -EM               User Key  (r) = Recorded By, (t) = Taken By, (c) = Cosigned By      Initials Name Provider Type    EM Sophie Quispe, PT Physical Therapist                   Clinical Impression       Row Name 02/28/24 1501          Pain    Pretreatment Pain Rating 0/10 - no pain  -EM     Pre/Posttreatment Pain Comment no c/o pain at rest, states her LLE is a \"little sore\" with ambulation but tolerable with use of rwx, did not rate on numeric scale  -EM     Pain Intervention(s) Repositioned  -EM       Row Name 02/28/24 1501          Plan of Care Review    Plan of Care Reviewed With patient;spouse  -EM     Outcome Evaluation Pt is 62 yo female admitted to [facility] for acute LLE cellulitis. PMH significant for HTN, IBS, bipolar. Patient lives with spouse, independent prior " to admission, using rwx for past few days due to soreness in LLE. patient agreeable to therapy, anxious to get up out of bed, performed bed mobility with SBA, sit to stand with CGA, ambulated in hallway with rwx and antalgic gait with CGA. Patient demonstrates impairments consisting of generalized weakness, decreased activity tolerance and would benefit from skilled PT. d/c plans are home with spouse, recommend use of [place] [facility] [place] up to chair and to bathroom, spoke with RN.  -EM       Row Name 02/28/24 1501          Therapy Assessment/Plan (PT)    Patient/Family Therapy Goals Statement (PT) go home  -EM     Rehab Potential (PT) good, to achieve stated therapy goals  -EM     Criteria for Skilled Interventions Met (PT) yes;skilled treatment is necessary  -EM     Therapy Frequency (PT) 3 times/wk  -EM       Row Name 02/28/24 1501          Positioning and Restraints    Pre-Treatment Position in bed  -EM     Post Treatment Position chair  -EM     In Chair reclined;call light within reach;exit alarm on;with family/caregiver  -EM               User Key  (r) = Recorded By, (t) = Taken By, (c) = Cosigned By      Initials Name Provider Type    EM Sophie Quispe, PT Physical Therapist                   Outcome Measures       Row Name 02/28/24 1505 02/28/24 0500       How much help from another person do you currently need...    Turning from your back to your side while in flat bed without using bedrails? 4  -EM 3  -MB    Moving from lying on back to sitting on the side of a flat bed without bedrails? 3  -EM 3  -MB    Moving to and from a bed to a chair (including a wheelchair)? 3  -EM 2  -MB    Standing up from a chair using your arms (e.g., wheelchair, bedside chair)? 3  -EM 2  -MB    Climbing 3-5 steps with a railing? 3  -EM 2  -MB    To walk in hospital room? 3  -EM 2  -MB    AM-PAC 6 Clicks Score (PT) 19  -EM 14  -MB    Highest Level of Mobility Goal 6 --> Walk 10 steps or more  -EM 4 -->  Transfer to chair/commode  -MB              User Key  (r) = Recorded By, (t) = Taken By, (c) = Cosigned By      Initials Name Provider Type    EM Sophie Quispe, PT Physical Therapist    Alem Naik RN Registered Nurse                                 Physical Therapy Education       Title: PT OT SLP Therapies (In Progress)       Topic: Physical Therapy (In Progress)       Point: Mobility training (Done)       Learning Progress Summary             Patient Acceptance, E, VU by EM at 2/28/2024 1505   Significant Other Acceptance, E, VU by EM at 2/28/2024 1505                         Point: Home exercise program (Not Started)       Learner Progress:  Not documented in this visit.              Point: Body mechanics (Not Started)       Learner Progress:  Not documented in this visit.              Point: Precautions (Not Started)       Learner Progress:  Not documented in this visit.                              User Key       Initials Effective Dates Name Provider Type Discipline     06/16/21 -  Sophie Quispe, PT Physical Therapist PT                  PT Recommendation and Plan  Planned Therapy Interventions (PT): bed mobility training, gait training, home exercise program, patient/family education, transfer training  Plan of Care Reviewed With: patient, spouse  Outcome Evaluation: Pt is 64 yo female admitted to [facility] for acute LLE cellulitis. PMH significant for HTN, IBS, bipolar. Patient lives with spouse, independent prior to admission, using rwx for past few days due to soreness in LLE. patient agreeable to therapy, anxious to get up out of bed, performed bed mobility with SBA, sit to stand with CGA, ambulated in hallway with rwx and antalgic gait with CGA. Patient demonstrates impairments consisting of generalized weakness, decreased activity tolerance and would benefit from skilled PT. d/c plans are home with spouse, recommend use of rwx for now [place] [facility] [place]ize up to chair and to  bathroom, spoke with RN.     Time Calculation:         PT Charges       Row Name 02/28/24 1506             Time Calculation    Start Time 1135  -EM      Stop Time 1153  -EM      Time Calculation (min) 18 min  -EM      PT Received On 02/28/24  -EM      PT - Next Appointment 03/01/24  -EM      PT Goal Re-Cert Due Date 03/06/24  -EM         Time Calculation- PT    Total Timed Code Minutes- PT 12 minute(s)  -EM         Timed Charges    59619 - PT Therapeutic Activity Minutes 12  -EM         Total Minutes    Timed Charges Total Minutes 12  -EM       Total Minutes 12  -EM                User Key  (r) = Recorded By, (t) = Taken By, (c) = Cosigned By      Initials Name Provider Type    EM Sophie Quispe, PT Physical Therapist                  Therapy Charges for Today       Code Description Service Date Service Provider Modifiers Qty    85131135170 HC PT THERAPEUTIC ACT EA 15 MIN 2/28/2024 Sophie Quispe, PT GP 1    31000268870 HC PT EVAL MOD COMPLEXITY 2 2/28/2024 Sophie Quispe PT GP 1            PT G-Codes  AM-PAC 6 Clicks Score (PT): 19  PT Discharge Summary  Anticipated Discharge Disposition (PT): home with assist    Sophie Quispe PT  2/28/2024

## 2024-02-29 VITALS
BODY MASS INDEX: 16.99 KG/M2 | OXYGEN SATURATION: 94 % | HEIGHT: 63 IN | TEMPERATURE: 97.9 F | WEIGHT: 95.9 LBS | HEART RATE: 87 BPM | DIASTOLIC BLOOD PRESSURE: 84 MMHG | RESPIRATION RATE: 18 BRPM | SYSTOLIC BLOOD PRESSURE: 140 MMHG

## 2024-02-29 LAB
ANION GAP SERPL CALCULATED.3IONS-SCNC: 9 MMOL/L (ref 5–15)
BASOPHILS # BLD AUTO: 0.1 10*3/MM3 (ref 0–0.2)
BASOPHILS NFR BLD AUTO: 0.7 % (ref 0–1.5)
BUN SERPL-MCNC: 19 MG/DL (ref 8–23)
BUN/CREAT SERPL: 23.2 (ref 7–25)
CALCIUM SPEC-SCNC: 8.8 MG/DL (ref 8.6–10.5)
CHLORIDE SERPL-SCNC: 102 MMOL/L (ref 98–107)
CO2 SERPL-SCNC: 26 MMOL/L (ref 22–29)
CREAT SERPL-MCNC: 0.82 MG/DL (ref 0.57–1)
DEPRECATED RDW RBC AUTO: 41.7 FL (ref 37–54)
EGFRCR SERPLBLD CKD-EPI 2021: 81.5 ML/MIN/1.73
EOSINOPHIL # BLD AUTO: 0.71 10*3/MM3 (ref 0–0.4)
EOSINOPHIL NFR BLD AUTO: 4.9 % (ref 0.3–6.2)
ERYTHROCYTE [DISTWIDTH] IN BLOOD BY AUTOMATED COUNT: 13.9 % (ref 12.3–15.4)
GLUCOSE SERPL-MCNC: 117 MG/DL (ref 65–99)
HCT VFR BLD AUTO: 34.6 % (ref 34–46.6)
HGB BLD-MCNC: 10.8 G/DL (ref 12–15.9)
IMM GRANULOCYTES # BLD AUTO: 0.05 10*3/MM3 (ref 0–0.05)
IMM GRANULOCYTES NFR BLD AUTO: 0.3 % (ref 0–0.5)
LYMPHOCYTES # BLD AUTO: 1.93 10*3/MM3 (ref 0.7–3.1)
LYMPHOCYTES NFR BLD AUTO: 13.4 % (ref 19.6–45.3)
MAGNESIUM SERPL-MCNC: 2.2 MG/DL (ref 1.6–2.4)
MCH RBC QN AUTO: 26 PG (ref 26.6–33)
MCHC RBC AUTO-ENTMCNC: 31.2 G/DL (ref 31.5–35.7)
MCV RBC AUTO: 83.4 FL (ref 79–97)
MONOCYTES # BLD AUTO: 1.04 10*3/MM3 (ref 0.1–0.9)
MONOCYTES NFR BLD AUTO: 7.2 % (ref 5–12)
NEUTROPHILS NFR BLD AUTO: 10.57 10*3/MM3 (ref 1.7–7)
NEUTROPHILS NFR BLD AUTO: 73.5 % (ref 42.7–76)
NRBC BLD AUTO-RTO: 0 /100 WBC (ref 0–0.2)
PLATELET # BLD AUTO: 536 10*3/MM3 (ref 140–450)
PMV BLD AUTO: 9.5 FL (ref 6–12)
POTASSIUM SERPL-SCNC: 4.5 MMOL/L (ref 3.5–5.2)
RBC # BLD AUTO: 4.15 10*6/MM3 (ref 3.77–5.28)
SODIUM SERPL-SCNC: 137 MMOL/L (ref 136–145)
WBC NRBC COR # BLD AUTO: 14.4 10*3/MM3 (ref 3.4–10.8)

## 2024-02-29 PROCEDURE — 25010000002 ONDANSETRON PER 1 MG: Performed by: PHYSICIAN ASSISTANT

## 2024-02-29 PROCEDURE — 25010000002 HYDROMORPHONE PER 4 MG: Performed by: HOSPITALIST

## 2024-02-29 PROCEDURE — 94664 DEMO&/EVAL PT USE INHALER: CPT

## 2024-02-29 PROCEDURE — 25010000002 KCL (0.149%) IN NACL 20-0.9 MEQ/L-% SOLUTION: Performed by: HOSPITALIST

## 2024-02-29 PROCEDURE — 97535 SELF CARE MNGMENT TRAINING: CPT

## 2024-02-29 PROCEDURE — G0378 HOSPITAL OBSERVATION PER HR: HCPCS

## 2024-02-29 PROCEDURE — 96361 HYDRATE IV INFUSION ADD-ON: CPT

## 2024-02-29 PROCEDURE — 25010000002 CEFAZOLIN IN DEXTROSE 2-4 GM/100ML-% SOLUTION: Performed by: HOSPITALIST

## 2024-02-29 PROCEDURE — 80048 BASIC METABOLIC PNL TOTAL CA: CPT | Performed by: HOSPITALIST

## 2024-02-29 PROCEDURE — 94799 UNLISTED PULMONARY SVC/PX: CPT

## 2024-02-29 PROCEDURE — 97165 OT EVAL LOW COMPLEX 30 MIN: CPT

## 2024-02-29 PROCEDURE — 94761 N-INVAS EAR/PLS OXIMETRY MLT: CPT

## 2024-02-29 PROCEDURE — 99214 OFFICE O/P EST MOD 30 MIN: CPT | Performed by: PHYSICIAN ASSISTANT

## 2024-02-29 PROCEDURE — 96376 TX/PRO/DX INJ SAME DRUG ADON: CPT

## 2024-02-29 PROCEDURE — 63710000001 PROMETHAZINE PER 12.5 MG: Performed by: PHYSICIAN ASSISTANT

## 2024-02-29 PROCEDURE — 25010000002 VANCOMYCIN 750 MG RECONSTITUTED SOLUTION 1 EACH VIAL: Performed by: INTERNAL MEDICINE

## 2024-02-29 PROCEDURE — 25010000002 ENOXAPARIN PER 10 MG: Performed by: HOSPITALIST

## 2024-02-29 PROCEDURE — 85025 COMPLETE CBC W/AUTO DIFF WBC: CPT | Performed by: HOSPITALIST

## 2024-02-29 PROCEDURE — 96372 THER/PROPH/DIAG INJ SC/IM: CPT

## 2024-02-29 PROCEDURE — 25810000003 SODIUM CHLORIDE 0.9 % SOLUTION 250 ML FLEX CONT: Performed by: INTERNAL MEDICINE

## 2024-02-29 PROCEDURE — 83735 ASSAY OF MAGNESIUM: CPT | Performed by: HOSPITALIST

## 2024-02-29 RX ORDER — CEPHALEXIN 500 MG/1
500 CAPSULE ORAL EVERY 8 HOURS SCHEDULED
Qty: 21 CAPSULE | Refills: 0 | Status: SHIPPED | OUTPATIENT
Start: 2024-02-29 | End: 2024-03-07

## 2024-02-29 RX ORDER — PANTOPRAZOLE SODIUM 40 MG/1
40 TABLET, DELAYED RELEASE ORAL
Qty: 60 TABLET | Refills: 0 | Status: SHIPPED | OUTPATIENT
Start: 2024-02-29 | End: 2024-03-30

## 2024-02-29 RX ORDER — PANTOPRAZOLE SODIUM 40 MG/1
40 TABLET, DELAYED RELEASE ORAL
Status: DISCONTINUED | OUTPATIENT
Start: 2024-02-29 | End: 2024-02-29 | Stop reason: HOSPADM

## 2024-02-29 RX ORDER — AMLODIPINE BESYLATE 10 MG/1
10 TABLET ORAL
Qty: 30 TABLET | Refills: 0 | Status: SHIPPED | OUTPATIENT
Start: 2024-03-01 | End: 2024-03-31

## 2024-02-29 RX ORDER — CEPHALEXIN 500 MG/1
500 CAPSULE ORAL EVERY 8 HOURS SCHEDULED
Status: DISCONTINUED | OUTPATIENT
Start: 2024-02-29 | End: 2024-02-29 | Stop reason: HOSPADM

## 2024-02-29 RX ORDER — DOXYCYCLINE 100 MG/1
100 CAPSULE ORAL EVERY 12 HOURS SCHEDULED
Status: DISCONTINUED | OUTPATIENT
Start: 2024-02-29 | End: 2024-02-29 | Stop reason: HOSPADM

## 2024-02-29 RX ORDER — DOXYCYCLINE 100 MG/1
100 CAPSULE ORAL EVERY 12 HOURS SCHEDULED
Qty: 13 CAPSULE | Refills: 0 | Status: SHIPPED | OUTPATIENT
Start: 2024-02-29 | End: 2024-03-07

## 2024-02-29 RX ORDER — PANTOPRAZOLE SODIUM 40 MG/1
40 TABLET, DELAYED RELEASE ORAL
Status: DISCONTINUED | OUTPATIENT
Start: 2024-03-01 | End: 2024-02-29

## 2024-02-29 RX ADMIN — HYDROMORPHONE HYDROCHLORIDE 0.5 MG: 1 INJECTION, SOLUTION INTRAMUSCULAR; INTRAVENOUS; SUBCUTANEOUS at 12:10

## 2024-02-29 RX ADMIN — METHOCARBAMOL TABLETS 500 MG: 500 TABLET, COATED ORAL at 02:35

## 2024-02-29 RX ADMIN — METOPROLOL TARTRATE 25 MG: 25 TABLET, FILM COATED ORAL at 08:18

## 2024-02-29 RX ADMIN — HYDROMORPHONE HYDROCHLORIDE 0.5 MG: 1 INJECTION, SOLUTION INTRAMUSCULAR; INTRAVENOUS; SUBCUTANEOUS at 08:17

## 2024-02-29 RX ADMIN — HYDROMORPHONE HYDROCHLORIDE 0.5 MG: 1 INJECTION, SOLUTION INTRAMUSCULAR; INTRAVENOUS; SUBCUTANEOUS at 04:24

## 2024-02-29 RX ADMIN — METHOCARBAMOL TABLETS 500 MG: 500 TABLET, COATED ORAL at 08:17

## 2024-02-29 RX ADMIN — LITHIUM CARBONATE 150 MG: 150 CAPSULE, GELATIN COATED ORAL at 08:17

## 2024-02-29 RX ADMIN — SODIUM CHLORIDE AND POTASSIUM CHLORIDE 75 ML/HR: 9; 1.49 INJECTION, SOLUTION INTRAVENOUS at 12:13

## 2024-02-29 RX ADMIN — PROMETHAZINE HYDROCHLORIDE 12.5 MG: 12.5 TABLET ORAL at 00:45

## 2024-02-29 RX ADMIN — DULOXETINE HYDROCHLORIDE 30 MG: 30 CAPSULE, DELAYED RELEASE ORAL at 08:18

## 2024-02-29 RX ADMIN — CEPHALEXIN 500 MG: 500 CAPSULE ORAL at 13:56

## 2024-02-29 RX ADMIN — MONTELUKAST SODIUM 10 MG: 10 TABLET, FILM COATED ORAL at 08:18

## 2024-02-29 RX ADMIN — TIOTROPIUM BROMIDE INHALATION SPRAY 1 PUFF: 3.12 SPRAY, METERED RESPIRATORY (INHALATION) at 07:22

## 2024-02-29 RX ADMIN — PANTOPRAZOLE SODIUM 40 MG: 40 INJECTION, POWDER, FOR SOLUTION INTRAVENOUS at 08:18

## 2024-02-29 RX ADMIN — CEFAZOLIN SODIUM 2000 MG: 2 INJECTION, SOLUTION INTRAVENOUS at 06:50

## 2024-02-29 RX ADMIN — AMLODIPINE BESYLATE 10 MG: 10 TABLET ORAL at 08:18

## 2024-02-29 RX ADMIN — ENOXAPARIN SODIUM 40 MG: 100 INJECTION SUBCUTANEOUS at 12:10

## 2024-02-29 RX ADMIN — DOXYCYCLINE 100 MG: 100 CAPSULE ORAL at 10:25

## 2024-02-29 RX ADMIN — ONDANSETRON 4 MG: 2 INJECTION INTRAMUSCULAR; INTRAVENOUS at 12:10

## 2024-02-29 RX ADMIN — PROMETHAZINE HYDROCHLORIDE 12.5 MG: 12.5 TABLET ORAL at 09:12

## 2024-02-29 RX ADMIN — VANCOMYCIN HYDROCHLORIDE 750 MG: 750 INJECTION, POWDER, LYOPHILIZED, FOR SOLUTION INTRAVENOUS at 05:47

## 2024-02-29 RX ADMIN — HYDROMORPHONE HYDROCHLORIDE 0.5 MG: 1 INJECTION, SOLUTION INTRAMUSCULAR; INTRAVENOUS; SUBCUTANEOUS at 00:39

## 2024-02-29 RX ADMIN — ONDANSETRON 4 MG: 2 INJECTION INTRAMUSCULAR; INTRAVENOUS at 05:47

## 2024-02-29 NOTE — PROGRESS NOTES
Case Management Discharge Note      Final Note: Discharged home. Brisa Donald RN         Selected Continued Care - Discharged on 2/29/2024 Admission date: 2/26/2024 - Discharge disposition: Home or Self Care       Transportation Services  Private: Car    Final Discharge Disposition Code: 01 - home or self-care

## 2024-02-29 NOTE — THERAPY EVALUATION
Patient Name: Carolina Cooney  : 1960    MRN: [id number]                              Today's Date: 2024       Admit Date: 2024    Visit Dx:     ICD-10-CM ICD-9-CM   1. Cellulitis of left lower extremity  L03.116 682.6   2. Elevated d-dimer  R79.89 790.92   3. Chronic midline low back pain with left-sided sciatica  M54.42 724.2    G89.29 724.3     338.29     Patient Active Problem List   Diagnosis    Encounter for gynecological examination without abnormal finding    Cellulitis of left lower extremity     Past Medical History:   Diagnosis Date    Anxiety and depression     Arthritis     GERD (gastroesophageal reflux disease)     Hypertension     IBS (irritable bowel syndrome)      Past Surgical History:   Procedure Laterality Date    BACK SURGERY       SECTION      GASTROSTOMY FEEDING TUBE INSERTION      LAPAROSCOPIC TUBAL LIGATION        General Information       Row Name 24 1104          OT Time and Intention    Document Type evaluation  -JOVANNA     Mode of Treatment occupational therapy;individual therapy  -JOVANNA       Row Name 24 1104          General Information    Patient Profile Reviewed yes  -JOVANNA     Prior Level of Function independent:  -JOVANNA     Existing Precautions/Restrictions fall;oxygen therapy device and L/min  4L via NC at baseline  -JOVANNA     Barriers to Rehab none identified  -JOVANNA       Row Name 24 1104          Living Environment    People in Home spouse;grandchild(zander)  -JOVANNA       Row Name 24 1104          Cognition    Orientation Status (Cognition) oriented x 4  -JOVANNA       Row Name 24 1104          Safety Issues, Functional Mobility    Impairments Affecting Function (Mobility) endurance/activity tolerance;strength;pain;balance  -JOVANNA     Comment, Safety Issues/Impairments (Mobility) Gait belt and non skid socks worn.  -JOVANNA               User Key  (r) = Recorded By, (t) = Taken By, (c) = Cosigned By      Initials Name Provider Type    JOVANNA Winsome Armenta, OT  Occupational Therapist                     Mobility/ADL's       [facility] Name 02/29/24 1107          Bed Mobility    Bed Mobility supine-sit  -JOVANNA     Supine-Sit [place] (Bed Mobility) standby assist  -JOVANNA     Assistive Device (Bed Mobility) head of bed elevated;bed rails  -JOVANNA       [facility] Name 02/29/24 1107          Transfers    Transfers sit-stand transfer;toilet transfer  -JOVANNA     Comment, (Transfers) EOB<>BR<>chair in room  -[facility] Name 02/29/24 1107          Sit-Stand Transfer    Sit-Stand [place] (Transfers) contact guard;verbal cues  -JOVANNA     Assistive Device (Sit-Stand Transfers) walker, front-wheeled  -JOVANNA       [facility] Name 02/29/24 1107          Toilet Transfer    Type (Toilet Transfer) sit-stand;stand-sit  -JOVANNA     Assistive Device (Toilet Transfer) grab bars/safety frame;walker, front-wheeled;raised toilet seat  -JOVANNA       [facility] Name 02/29/24 1107          Functional Mobility    Functional Mobility- Ind. Level contact guard assist;minimum assist (75% patient effort);verbal cues required  -JOVANNA     Functional Mobility- Device walker, front-wheeled  -JOVANNA     Functional Mobility- Comment EOB<>BR<>chair in room  -[facility] Name 02/29/24 1107          Activities of Daily Living    BADL Assessment/Intervention lower body dressing;grooming;toileting  -JOVANNA       Row Name 02/29/24 1107          Lower Body Dressing Assessment/Training    [place] Level (Lower Body Dressing) lower body dressing skills;don;socks;set up;standby assist  -JOVANNA     Position (Lower Body Dressing) edge of bed sitting  -JOVANNA       [facility] Name 02/29/24 1107          Grooming Assessment/Training    [place] Level (Grooming) grooming skills;wash face, hands;set up  -JOVANNA     Position (Grooming) edge of bed sitting  -JOVANNA       [facility] Name 02/29/24 1107          Toileting Assessment/Training    [place] Level (Toileting) toileting skills;adjust/manage clothing;change pad/brief;perform perineal hygiene;set up  -JOVANNA     Assistive Devices (Toileting)  grab bar/safety frame;raised toilet seat  -JOVANNA     Position (Toileting) unsupported sitting;supported standing  -JOVANNA     Comment, (Toileting) Cues for hand placement when reaching back to sit on toilet  -JOVANNA               User Key  (r) = Recorded By, (t) = Taken By, (c) = Cosigned By      Initials Name Provider Type    Winsome Meredith OT Occupational Therapist                   Obj/Interventions       Row Name 02/29/24 1114          Sensory Assessment (Somatosensory)    Sensory Assessment (Somatosensory) UE sensation intact  -JOVANNA       [facility] Name 02/29/24 1114          Vision Assessment/Intervention    Visual Impairment/Limitations WFL  -JOVANNA       [facility] Name 02/29/24 1114          Range of Motion Comprehensive    General Range of Motion bilateral upper extremity ROM L  -JOVANNA       [facility] Name 02/29/24 1114          Strength Comprehensive (MMT)    Comment, General Manual Muscle Testing (MMT) Assessment BUE grossly 3+/5  -JOVANNA       [facility] Name 02/29/24 1114          Motor Skills    Motor Skills functional endurance  -JOVANNA     Functional Endurance Fair+  -JOVANNA       [facility] Name 02/29/24 1114          Balance    Balance Assessment sitting dynamic balance;standing static balance;sitting static balance;standing dynamic balance  -JOVANNA     Static Sitting Balance standby assist  -JOVANNA     Dynamic Sitting Balance standby assist  -JOVANNA     Position, Sitting Balance sitting edge of bed  -JOVANNA     Static Standing Balance contact guard  -JOVANNA     Dynamic Standing Balance contact guard  -JOVANNA     Position/Device Used, Standing Balance walker, front-wheeled  -JOVANNA     Balance Interventions sitting;standing;occupation based/functional task;dynamic;static  -JOVANNA               User Key  (r) = Recorded By, (t) = Taken By, (c) = Cosigned By      Initials Name Provider Type    Winsome Meredith OT Occupational Therapist                   Goals/Plan       Row Name 02/29/24 1131          Bed Mobility Goal 1 (OT)    Activity/Assistive Device (Bed Mobility Goal 1, OT) bed  mobility activities, all  -JOVANNA     [place] Level/Cues Needed (Bed Mobility Goal 1, OT) modified independence  -JOVANNA     Time Frame (Bed Mobility Goal 1, OT) short term goal (STG);2 weeks  -JOVANNA     Progress/Outcomes (Bed Mobility Goal 1, OT) new goal  -JOVANNA       Row Name 02/29/24 1131          Transfer Goal 1 (OT)    Activity/Assistive Device (Transfer Goal 1, OT) transfers, all  -JOVANNA     [place] Level/Cues Needed (Transfer Goal 1, OT) modified independence  -JOVANNA     Time Frame (Transfer Goal 1, OT) short term goal (STG);2 weeks  -JOVANNA     Progress/Outcome (Transfer Goal 1, OT) new goal  -JOVANNA       Row Name 02/29/24 1131          Dressing Goal 1 (OT)    Activity/Device (Dressing Goal 1, OT) dressing skills, all;upper body dressing;lower body dressing  -JOVANNA     [place]/Cues Needed (Dressing Goal 1, OT) modified independence  -JOVANNA     Time Frame (Dressing Goal 1, OT) short term goal (STG);2 weeks  -JOVANNA     Progress/Outcome (Dressing Goal 1, OT) new goal  -JOVANNA       Row Name 02/29/24 1131          Toileting Goal 1 (OT)    Activity/Device (Toileting Goal 1, OT) toileting skills, all  -JOVANNA     [place] Level/Cues Needed (Toileting Goal 1, OT) modified independence  -JOVANNA     Time Frame (Toileting Goal 1, OT) short term goal (STG);2 weeks  -JOVANNA     Progress/Outcome (Toileting Goal 1, OT) new goal  -JOVANNA       Row Name 02/29/24 1131          Grooming Goal 1 (OT)    Activity/Device (Grooming Goal 1, OT) grooming skills, all  -JOVANNA     [place] (Grooming Goal 1, OT) modified independence  -JOVANNA     Time Frame (Grooming Goal 1, OT) short term goal (STG);2 weeks  -JOVANNA     Progress/Outcome (Grooming Goal 1, OT) new goal  -JOVANNA       Row Name 02/29/24 1131          Therapy Assessment/Plan (OT)    Planned Therapy Interventions (OT) activity tolerance training;BADL retraining;functional balance retraining;occupation/activity based interventions;patient/caregiver education/training;strengthening exercise;transfer/mobility  retraining;adaptive equipment training;ROM/therapeutic exercise  -JOVANNA               User Key  (r) = Recorded By, (t) = Taken By, (c) = Cosigned By      Initials Name Provider Type    Winsome Meredith, SOREN Occupational Therapist                   Clinical Impression       Row Name 02/29/24 1116          Pain Assessment    Pretreatment Pain Rating 7/10  -JOVANNA     Posttreatment Pain Rating 7/10  -JOVANNA     Pain Location - Side/Orientation Left  -JOVANNA     Pain Location lower  -JOVANNA     Pain Location - extremity  -JOVANNA     Pre/Posttreatment Pain Comment c/o pain in LLE  -JOVANNA     Pain Intervention(s) Repositioned;Nursing Notified;Rest  -JOVANNA       Row Name 02/29/24 1116          Plan of Care Review    Plan of Care Reviewed With patient  -JOVANNA     Outcome Evaluation Pt is a 64 y/o F admitted to [facility] for acute LLE cellulitis. PMHx significant for HTN, IBS, and bipolar disorder. Pt lives at home with her , DIL and grandchildren and IND with ADLs using FWWx at baseline. Pt fowlers in bed on OT arrival and agreeable to evaluation this AM.  at bedside but leaving on OT arrival. Pt presents to OT with generalized weakness, LLE pain, and decreased endurance and act tolerance and may benefit from skilled OT services to maximize IND and safety with ADLs and fxl mobility. Anticipates home with spouse and family to assist at [place].  -JOVANNA       Row Name 02/29/24 1116          Therapy Assessment/Plan (OT)    Therapy Frequency (OT) 3 times/wk  -JOVANNA       Row Name 02/29/24 1116          Therapy Plan Review/Discharge Plan (OT)    Anticipated Discharge Disposition (OT) home with assist  -JOVANNA       Row Name 02/29/24 1116          Vital Signs    O2 Delivery Pre Treatment room air  -JOVANNA     O2 Delivery Intra Treatment room air  -JOVANNA     O2 Delivery Post Treatment supplemental O2  4L via NC  -JOVANNA       Row Name 02/29/24 1116          Positioning and Restraints    Pre-Treatment Position in bed  -JOVANNA     Post Treatment Position chair  -JOVANNA     In Chair  reclined;call light within reach;encouraged to call for assist;exit alarm on  -JOVANNA               User Key  (r) = Recorded By, (t) = Taken By, (c) = Cosigned By      Initials Name Provider Type    Winsome Meredith OT Occupational Therapist                   Outcome Measures       Row Name 02/29/24 1132          How much help from another is currently needed...    Putting on and taking off regular lower body clothing? 3  -JOVANNA     Bathing (including washing, rinsing, and drying) 3  -JOVANNA     Toileting (which includes using toilet bed pan or urinal) 3  -JOVANNA     Putting on and taking off regular upper body clothing 4  -JOVANNA     Taking care of personal grooming (such as brushing teeth) 4  -JOVANNA     Eating meals 4  -JOVANNA     AM-PAC 6 Clicks Score (OT) 21  -JOVANNA       Row Name 02/29/24 0817          How much help from another person do you currently need...    Turning from your back to your side while in flat bed without using bedrails? 4  -TW     Moving from lying on back to sitting on the side of a flat bed without bedrails? 3  -TW     Moving to and from a bed to a chair (including a wheelchair)? 3  -TW     Standing up from a chair using your arms (e.g., wheelchair, bedside chair)? 3  -TW     Climbing 3-5 steps with a railing? 3  -TW     To walk in hospital room? 3  -TW     AM-PAC 6 Clicks Score (PT) 19  -TW     Highest Level of Mobility Goal 6 --> Walk 10 steps or more  -TW       Row Name 02/29/24 1132          Functional Assessment    Outcome Measure Options AM-PAC 6 Clicks Daily Activity (OT)  -JOVANNA               User Key  (r) = Recorded By, (t) = Taken By, (c) = Cosigned By      Initials Name Provider Type    TW Aline Torres RN Registered Nurse    Winsome Meredith OT Occupational Therapist                    Occupational Therapy Education       Title: PT OT SLP Therapies (In Progress)       Topic: Occupational Therapy (Done)       Point: ADL training (Done)       Description:   Instruct learner(s) on proper safety adaptation and  remediation techniques during self care or transfers.   Instruct in proper use of assistive devices.                  Learning Progress Summary             Patient Acceptance, E, VU by JOVANNA at 2/29/2024 1133    Comment: Role of OT and safety techniques                         Point: Home exercise program (Done)       Description:   Instruct learner(s) on appropriate technique for monitoring, assisting and/or progressing therapeutic exercises/activities.                  Learning Progress Summary             Patient Acceptance, E, VU by JOVANNA at 2/29/2024 1133    Comment: Role of OT and safety techniques                         Point: Precautions (Done)       Description:   Instruct learner(s) on prescribed precautions during self-care and functional transfers.                  Learning Progress Summary             Patient Acceptance, E, VU by JOVANNA at 2/29/2024 1133    Comment: Role of OT and safety techniques                         Point: Body mechanics (Done)       Description:   Instruct learner(s) on proper positioning and spine alignment during self-care, functional mobility activities and/or exercises.                  Learning Progress Summary             Patient Acceptance, E, VU by JOVANNA at 2/29/2024 1133    Comment: Role of OT and safety techniques                                         User Key       Initials Effective Dates Name Provider Type Discipline    JOVANNA 10/12/23 -  Winsome Armenta OT Occupational Therapist OT                  OT Recommendation and Plan  Planned Therapy Interventions (OT): activity tolerance training, BADL retraining, functional balance retraining, occupation/activity based interventions, patient/caregiver education/training, strengthening exercise, transfer/mobility retraining, adaptive equipment training, ROM/therapeutic exercise  Therapy Frequency (OT): 3 times/wk  Plan of Care Review  Plan of Care Reviewed With: patient  Outcome Evaluation: Pt is a 62 y/o F admitted to [facility] for acute LLE  cellulitis. PMHx significant for HTN, IBS, and bipolar disorder. Pt lives at home with her , DIL and grandchildren and IND with ADLs using FWWx at baseline. Pt fowlers in bed on OT arrival and agreeable to evaluation this AM.  at bedside but leaving on OT arrival. Pt presents to OT with generalized weakness, LLE pain, and decreased endurance and act tolerance and may benefit from skilled OT services to maximize IND and safety with ADLs and fxl mobility. Anticipates home with spouse and family to assist at [place].     Time Calculation:   Evaluation Complexity (OT)  Review Occupational Profile/Medical/Therapy History Complexity: brief/low complexity  Assessment, Occupational Performance/Identification of Deficit Complexity: 1-3 performance deficits  Clinical Decision Making Complexity (OT): problem focused assessment/low complexity  Overall Complexity of Evaluation (OT): low complexity     Time Calculation- OT       Row Name 02/29/24 1134             Time Calculation- OT    OT Start Time 0925  -JOVANNA      OT Stop Time 0945  -JOVANNA      OT Time Calculation (min) 20 min  -JOVANNA      Total Timed Code Minutes- OT 10 minute(s)  -JOVANNA      OT Received On 02/29/24  -JOVANNA      OT - Next Appointment 03/01/24  -JOVANNA      OT Goal Re-Cert Due Date 03/07/24  -JOVANNA         Timed Charges    40446 - OT Self Care/Mgmt Minutes 10  -JOVANNA         Untimed Charges    OT Eval/Re-eval Minutes 10  -JOVANNA         Total Minutes    Timed Charges Total Minutes 10  -JOVANNA      Untimed Charges Total Minutes 10  -JOVANNA       Total Minutes 20  -JOVANNA                User Key  (r) = Recorded By, (t) = Taken By, (c) = Cosigned By      Initials Name Provider Type    JOVANNA Winsome Armenta OT Occupational Therapist                  Therapy Charges for Today       Code Description Service Date Service Provider Modifiers Qty    09493781226 HC OT SELF CARE/MGMT/TRAIN EA 15 MIN 2/29/2024 Winsome Armenta OT GO 1    10281728999 HC OT EVAL LOW COMPLEXITY 2 2/29/2024 Winsome Armenta OT GO 1                  Winsome Armenta, OT  2/29/2024

## 2024-02-29 NOTE — PLAN OF CARE
Goal Outcome Evaluation:  Plan of Care Reviewed With: patient           Outcome Evaluation: Pt is a 62 y/o F admitted to [facility] for acute LLE cellulitis. PMHx significant for HTN, IBS, and bipolar disorder. Pt lives at home with her , DIL and grandchildren and IND with ADLs using FWWx at baseline. Pt fowlers in bed on OT arrival and agreeable to evaluation this AM.  at bedside but leaving on OT arrival. Pt presents to OT with generalized weakness, LLE pain, and decreased endurance and act tolerance and may benefit from skilled OT services to maximize IND and safety with ADLs and fxl mobility. Anticipates home with spouse and family to assist at [place].      Anticipated Discharge Disposition (OT): home with assist

## 2024-02-29 NOTE — PROGRESS NOTES
[facility]  Inpatient Progress Note    Reason for Followup: Nausea, vomiting    Subjective     Interval History:   Hemoglobin stable.  Patient did have venous duplex yesterday with evidence of a DVT.  Patient reports she is doing quite well this morning.  She has had some nausea which is controlled with Phenergan.  She denies abdominal pain or vomiting.  She tolerated a clear liquid diet well and is ready for regular food.  No overt evidence of upper GI bleed this morning.    Current Facility-Administered Medications:     amitriptyline (ELAVIL) tablet 50 mg, 50 mg, Oral, Nightly, Lawson Rodrigues MD, 50 mg at 02/28/24 2033    amLODIPine (NORVASC) tablet 10 mg, 10 mg, Oral, Q24H, Lawson Rodrigues MD, 10 mg at 02/29/24 0818    ceFAZolin in dextrose (ANCEF) IVPB solution 2,000 mg, 2,000 mg, Intravenous, Q8H, Lawson Rodrigues MD, 2,000 mg at 02/29/24 0650    DULoxetine (CYMBALTA) DR capsule 30 mg, 30 mg, Oral, Daily, Lawson Rodrigues MD, 30 mg at 02/29/24 0818    Enoxaparin Sodium (LOVENOX) syringe 40 mg, 1 mg/kg, Subcutaneous, Q12H, Lawson Rodrigues MD, 40 mg at 02/28/24 2303    hydrALAZINE (APRESOLINE) injection 10 mg, 10 mg, Intravenous, Q4H PRN, Lawson Rodrigues MD    HYDROmorphone (DILAUDID) injection 0.5 mg, 0.5 mg, Intravenous, Q4H PRN, Lawson Rodrigues MD, 0.5 mg at 02/29/24 0817    ipratropium-albuterol (DUO-NEB) nebulizer solution 3 mL, 3 mL, Nebulization, Q4H PRN, Lawson Rodrigues MD    lithium carbonate capsule 150 mg, 150 mg, Oral, BID, Lawson Rodrigues MD, 150 mg at 02/29/24 0817    methocarbamol (ROBAXIN) tablet 500 mg, 500 mg, Oral, Q6H PRN, Lawson Rodrigues MD, 500 mg at 02/29/24 0817    metoprolol tartrate (LOPRESSOR) injection 2.5 mg, 2.5 mg, Intravenous, Q6H PRN, Lawson Rodrigues MD, 2.5 mg at 02/28/24 1113    metoprolol tartrate (LOPRESSOR) tablet 25 mg, 25 mg, Oral, Q12H, Lawson Rodrigues MD, 25 mg at 02/29/24 0818    montelukast (SINGULAIR) tablet 10 mg, 10 mg, Oral, Daily, Lawson Rodrigues MD, 10 mg at  02/29/24 0818    ondansetron (ZOFRAN) injection 4 mg, 4 mg, Intravenous, Q6H, Gianfranco Jeffries PA-C, 4 mg at 02/29/24 0547    pantoprazole (PROTONIX) injection 40 mg, 40 mg, Intravenous, Q12H, Gianfranco Jeffries PA-C, 40 mg at 02/29/24 0818    Pharmacy to dose vancomycin, , Does not apply, Continuous PRN, Bia Casas MD    promethazine (PHENERGAN) tablet 12.5 mg, 12.5 mg, Oral, Q8H PRN, Gianfranco Jeffries PA-C, 12.5 mg at 02/29/24 0045    [COMPLETED] Insert Peripheral IV, , , Once **AND** sodium chloride 0.9 % flush 10 mL, 10 mL, Intravenous, PRN, [place] MD    sodium chloride 0.9 % with KCl 20 mEq/L infusion, 75 mL/hr, Intravenous, Continuous, Lawson Rodrigues MD, Last Rate: 75 mL/hr at 02/28/24 2308, 75 mL/hr at 02/28/24 2308    tiotropium (SPIRIVA RESPIMAT) 2.5 mcg/act aerosol solution inhaler, 1 puff, Inhalation, Daily - RT, Lawson Rodrigues MD, 1 puff at 02/29/24 0722    vancomycin 750 mg in sodium chloride 0.9 % 250 mL IVPB-VTB, 750 mg, Intravenous, Q12H, Bia Casas MD, Last Rate: 333.3 mL/hr at 02/29/24 0547, 750 mg at 02/29/24 0547    Objective     Vital Signs  Temp:  [97.1 °F (36.2 °C)-98.8 °F (37.1 °C)] 98.2 °F (36.8 °C)  Heart Rate:  [] 90  Resp:  [16-20] 18  BP: (140-171)/() 144/90  Body mass index is 16.99 kg/m².    Intake/Output Summary (Last 24 hours) at 2/29/2024 0819  Last data filed at 2/28/2024 2307  Gross per 24 hour   Intake 885 ml   Output --   Net 885 ml     No intake/output data recorded.     Physical Exam:   General: patient awake, alert and cooperative   Abdomen: soft, nontender, nondistended; normal bowel sounds     Results Review:     I reviewed the patient's new clinical results.    Results from last 7 days   Lab Units 02/29/24  0532 02/28/24  0506 02/27/24  0511   WBC 10*3/mm3 14.40* 22.84* 15.10*   HEMOGLOBIN g/dL 10.8* 11.5* 11.2*   HEMATOCRIT % 34.6 36.5 35.8   PLATELETS 10*3/mm3 536* 672* 566*     Results from last 7 days   Lab Units 02/29/24  0532 02/28/24  0506  02/27/24  0511 02/26/24 2004   SODIUM mmol/L 137 137 140 139   POTASSIUM mmol/L 4.5 3.1* 4.0 4.2   CHLORIDE mmol/L 102 93* 100 100   CO2 mmol/L 26.0 28.6 26.6 30.3*   BUN mg/dL 19 22 16 23   CREATININE mg/dL 0.82 0.66 0.55* 0.57   CALCIUM mg/dL 8.8 9.1 9.1 9.3   BILIRUBIN mg/dL  --  0.3  --  <0.2   ALK PHOS U/L  --  92  --  104   ALT (SGPT) U/L  --  11  --  11   AST (SGOT) U/L  --  16  --  17   GLUCOSE mg/dL 117* 143* 107* 103*         Lab Results   Lab Value Date/Time    LIPASE 6 05/22/2023 1356       Radiology:  XR Foot 3+ View Left   Final Result   FINDINGS AND IMPRESSION:   No fracture is seen. While no focal osteolysis is seen, given patient   history, if there is clinical concern for osseous infection, further   evaluation with MRI of the left foot with and without contrast is   recommended to exclude osteomyelitis.       This report was finalized on 2/28/2024 4:30 PM by Dr. David Ledesma M.D   on Workstation: [id number]          CT Abdomen Pelvis With Contrast   Final Result            Assessment & Plan   Assessment:   Cellulitis  R LE DVT  Coffee ground emesis- likely due to known esophagitis. Her n/v likely exacerbated by gastroparesis in setting of infection  Leukocytosis  LA grade D Esophagitis as seen on EGD 02/16/2024 at [facility]     All problems are new to me.  Plan:   No further vomiting this morning. She is with some residual nausea but is currently controlled and recommend scheduled zofran + phenergan PRN.   Will defer EGD at this time given stable H/H and no ongoing GI bleed at this time. She has known hx of gastroparesis and severe esophagitis which is contributing to her current GI symptoms.   Okay to advance diet to low irritant diet     I discussed the patient's findings and my recommendations with patient.    Dictated utilizing Dragon dictation.        Della Kennedy PA-C   [facility]  [address] [place], [place] [id number]  Office: [phone]

## 2024-02-29 NOTE — PROGRESS NOTES
Continued Stay Note  [facility]     Patient Name: Carolina Cooney  MRN: [id number]  Today's Date: 2/29/2024    Admit Date: 2/26/2024    Plan: Home no needs   Discharge Plan       Row Name 02/29/24 1440       Plan    Plan Home no needs    Plan Comments Discharge order noted. Met with patient who confirmed DC plan is to return home. Stated she wears O2 at 4L/NC at HS through [employer]. Stated her spouse will assist as needed and will provide transportation at DC. Denies any needs/equipment.                   Discharge Codes    No documentation.                 Expected Discharge Date and Time       Expected Discharge Date Expected Discharge Time    Feb 29, 2024               Brisa Donald RN

## 2024-02-29 NOTE — DISCHARGE SUMMARY
Discharge summary    Date of admission 2/26/2024  Date of discharge 2/29/2024    Final diagnosis  Acute left lower extremity cellulitis  Acute left lower extremity DVT  Persistent nausea vomiting with history of esophagitis and gastroparesis resolved  Chronic hypoxic respiratory failure  Hypertension  Irritable bowel syndrome  Degenerative disc disease  Bipolar disorder   Chronic pain syndrome  Gastroesophageal reflux disease    Discharge medications    Current Facility-Administered Medications:     amitriptyline (ELAVIL) tablet 50 mg, 50 mg, Oral, Nightly, Lawson Rodrigues MD, 50 mg at 02/28/24 2033    amLODIPine (NORVASC) tablet 10 mg, 10 mg, Oral, Q24H, Lawson Rodrigues MD, 10 mg at 02/29/24 0818    apixaban (ELIQUIS) tablet 5 mg, 5 mg, Oral, Q12H, Lawson Rodrigues MD    cephalexin (KEFLEX) capsule 500 mg, 500 mg, Oral, Q8H, Bia Casas MD    doxycycline (MONODOX) capsule 100 mg, 100 mg, Oral, Q12H, Bia Casas MD, 100 mg at 02/29/24 1025    DULoxetine (CYMBALTA) DR capsule 30 mg, 30 mg, Oral, Daily, Lawson Rodrigues MD, 30 mg at 02/29/24 0818    lithium carbonate capsule 150 mg, 150 mg, Oral, BID, Lawson Rodrigues MD, 150 mg at 02/29/24 0817    metoprolol tartrate (LOPRESSOR) tablet 25 mg, 25 mg, Oral, Q12H, Lawson Rodrigues MD, 25 mg at 02/29/24 0818    montelukast (SINGULAIR) tablet 10 mg, 10 mg, Oral, Daily, Lawson Rodrigues MD, 10 mg at 02/29/24 0818    ondansetron (ZOFRAN) injection 4 mg, 4 mg, Intravenous, Q6H, Gianfranco Jeffries PA-C, 4 mg at 02/29/24 1210    pantoprazole (PROTONIX) EC tablet 40 mg, 40 mg, Oral, BID AC, Lawson Rodrigues MD    [COMPLETED] Insert Peripheral IV, , , Once **AND** sodium chloride 0.9 % flush 10 mL, 10 mL, Intravenous, PRN, JonnathanRandy martinez MD    tiotropium (SPIRIVA RESPIMAT) 2.5 mcg/act aerosol solution inhaler, 1 puff, Inhalation, Daily - RT, Lawson Rodrigues MD, 1 puff at 02/29/24 0722     Consults obtained  Gastroenterology   Infectious disease    Procedures   None    63-year-old white female with  chronic hypoxic respiratory failure on home oxygen severe esophagitis gastroparesis irritable bowel syndrome hypertension bipolar disorder and chronic pain syndrome admitted to emergency room with left leg swelling pain redness and workup in ER revealed cellulitis admitted for management.  Patient admitted treated with IV antibiotics after obtaining the cultures and also found to have acute left lower extremity DVT treated with Lovenox and switch to Eliquis.  During hospitalization patient developed nausea vomiting with history of esophagitis and gastroparesis treated with IV fluid IV Protonix and GI consult obtained and they recommend symptomatic treatment and no endoscopy as she recently had EGD done.  Patient symptoms resolved tolerating diet and wants to go home.  Patient antibiotics changed to by mouth and Lovenox changed to Eliquis and also Protonix changed to by mouth.  Patient cleared for discharge from infectious disease and gastroenterology.    Discharge diet regular    Activity as tolerated    Medication as above    Follow-up with prime doctor in 1 week and follow-up with gastroenterology and infectious disease per the instructions and take medication as directed.    RAJI BORDEN MD

## 2024-02-29 NOTE — PROGRESS NOTES
"Daily progress note    Primary care physician      Subjective  Doing better with no new complaint and wants to go home    History of present illness  63-year-old white female with history of chronic hypoxic respiratory failure on home oxygen gastroparesis anxiety depression degenerative disc disease chronic pain syndrome presented to [facility] emergency room with left leg pain swelling and redness for last 4 days which is getting worse.  Patient denies any fall trauma injury.  Patient denies any fever chills chest pain increase shortness of breath palpitation.  Patient evaluated in ER found to have left lower extremity colitis and also found to have elevated D-dimer need to rule out DVT admitted for management.  At the time of examination she is in a lot of pain asking for pain medication.  Patient give me detailed history and her  also contributed.     REVIEW OF SYSTEMS  Unremarkable     PHYSICAL EXAM   Blood pressure (!) 165/102, pulse 94, temperature 97.1 °F (36.2 °C), temperature source Oral, resp. rate 18, height 160 cm (62.99\"), weight 43.5 kg (95 lb 14.4 oz), SpO2 93%.    GENERAL: Awake and alert and  no distress  SKIN: Examination of the foot reveals some warmth and erythema on the lateral foot going up towards the ankle consistent with some possible developing cellulitis.  HENT: Normocephalic, atraumatic  EYES: no scleral icterus  CV: regular rhythm, regular rate  RESPIRATORY: Normal effort and moving air bilaterally  ABDOMEN: soft, nontender, nondistended bowel sounds positive  MUSCULOSKELETAL: Moderate swelling of the left leg from the knee down to the foot.    NEURO: alert, moves all extremities, follows commands     LAB RESULTS  Lab Results (last 24 hours)       Procedure Component Value Units Date/Time    Basic Metabolic Panel [[id number]]  (Abnormal) Collected: 02/29/24 0532    Specimen: Blood Updated: 02/29/24 0626     Glucose 117 mg/dL      BUN 19 mg/dL      Creatinine 0.82 " mg/dL      Sodium 137 mmol/L      Potassium 4.5 mmol/L      Comment: Slight hemolysis detected by analyzer. Result may be falsely elevated.        Chloride 102 mmol/L      CO2 26.0 mmol/L      Calcium 8.8 mg/dL      BUN/Creatinine Ratio 23.2     Anion Gap 9.0 mmol/L      eGFR 81.5 mL/min/1.73     Narrative:      GFR Normal >60  Chronic Kidney Disease <60  Kidney Failure <15      Magnesium [[id number]]  (Normal) Collected: 02/29/24 0532    Specimen: Blood Updated: 02/29/24 0626     Magnesium 2.2 mg/dL     CBC & Differential [[id number]]  (Abnormal) Collected: 02/29/24 0532    Specimen: Blood Updated: 02/29/24 0610    Narrative:      The following orders were created for panel order CBC & Differential.  Procedure                               Abnormality         Status                     ---------                               -----------         ------                     CBC Auto Differential[[id number]]        Abnormal            Final result                 Please view results for these tests on the individual orders.    CBC Auto Differential [[id number]]  (Abnormal) Collected: 02/29/24 0532    Specimen: Blood Updated: 02/29/24 0610     WBC 14.40 10*3/mm3      RBC 4.15 10*6/mm3      Hemoglobin 10.8 g/dL      Hematocrit 34.6 %      MCV 83.4 fL      MCH 26.0 pg      MCHC 31.2 g/dL      RDW 13.9 %      RDW-SD 41.7 fl      MPV 9.5 fL      Platelets 536 10*3/mm3      Neutrophil % 73.5 %      Lymphocyte % 13.4 %      Monocyte % 7.2 %      Eosinophil % 4.9 %      Basophil % 0.7 %      Immature Grans % 0.3 %      Neutrophils, Absolute 10.57 10*3/mm3      Lymphocytes, Absolute 1.93 10*3/mm3      Monocytes, Absolute 1.04 10*3/mm3      Eosinophils, Absolute 0.71 10*3/mm3      Basophils, Absolute 0.10 10*3/mm3      Immature Grans, Absolute 0.05 10*3/mm3      nRBC 0.0 /100 WBC     Blood Culture - Blood, Arm, Left [[id number]]  (Normal) Collected: 02/26/24 2059    Specimen: Blood from Arm, Left Updated: 02/28/24 2116     Blood  Culture No growth at 2 days    Vancomycin, Trough Please obtain prior to vancomycin administration [[id number]]  (Normal) Collected: 02/28/24 1713    Specimen: Blood Updated: 02/28/24 1757     Vancomycin Trough 8.10 mcg/mL     Narrative:      Therapeutic Ranges for Vancomycin    Vancomycin Random   5.0-40.0 mcg/mL  Vancomycin Trough   5.0-20.0 mcg/mL  Vancomycin Peak     20.0-40.0 mcg/mL          Imaging Results (Last 24 Hours)       Procedure Component Value Units Date/Time    XR Foot 3+ View Left [[id number]] Collected: 02/28/24 1618     Updated: 02/28/24 1633    Narrative:      Left foot radiograph     HISTORY: Injury and cellulitis, swelling     TECHNIQUE: AP, lateral and oblique radiographs of the left foot     COMPARISON: None       Impression:      FINDINGS AND IMPRESSION:  No fracture is seen. While no focal osteolysis is seen, given patient  history, if there is clinical concern for osseous infection, further  evaluation with MRI of the left foot with and without contrast is  recommended to exclude osteomyelitis.     This report was finalized on 2/28/2024 4:30 PM by Dr. David Ledesma M.D  on Workstation: [id number]             Interpretation Summary    Acute left lower extremity deep vein thrombosis noted in the mid femoral and gastrocnemius.    All other veins appeared normal bilaterally.    Current Facility-Administered Medications:     amitriptyline (ELAVIL) tablet 50 mg, 50 mg, Oral, Nightly, Lawson Rodrigues MD, 50 mg at 02/28/24 2033    amLODIPine (NORVASC) tablet 10 mg, 10 mg, Oral, Q24H, Lawson Rodrigues MD, 10 mg at 02/29/24 0818    apixaban (ELIQUIS) tablet 5 mg, 5 mg, Oral, Q12H, Lawson Rodrigues MD    cephalexin (KEFLEX) capsule 500 mg, 500 mg, Oral, Q8H, Bia Casas MD    doxycycline (MONODOX) capsule 100 mg, 100 mg, Oral, Q12H, Bia Casas MD, 100 mg at 02/29/24 1025    DULoxetine (CYMBALTA) DR capsule 30 mg, 30 mg, Oral, Daily, Lawson Rodrigues MD, 30 mg at 02/29/24 0818    lithium carbonate capsule 150  mg, 150 mg, Oral, BID, Raji Rodrigues MD, 150 mg at 02/29/24 0817    metoprolol tartrate (LOPRESSOR) tablet 25 mg, 25 mg, Oral, Q12H, Raji Rodrigues MD, 25 mg at 02/29/24 0818    montelukast (SINGULAIR) tablet 10 mg, 10 mg, Oral, Daily, Raji Rodrigues MD, 10 mg at 02/29/24 0818    ondansetron (ZOFRAN) injection 4 mg, 4 mg, Intravenous, Q6H, Gianfranco Jeffries PA-C, 4 mg at 02/29/24 1210    pantoprazole (PROTONIX) EC tablet 40 mg, 40 mg, Oral, BID AC, Raji Rodrigues MD    [COMPLETED] Insert Peripheral IV, , , Once **AND** sodium chloride 0.9 % flush 10 mL, 10 mL, Intravenous, PRN, Jonnathan, Randy LORENZO MD    tiotropium (SPIRIVA RESPIMAT) 2.5 mcg/act aerosol solution inhaler, 1 puff, Inhalation, Daily - RT, Raji Rodrigues MD, 1 puff at 02/29/24 0722     ASSESSMENT  Acute left lower extremity cellulitis  Acute left lower extremity DVT  Persistent nausea vomiting with history of esophagitis and gastroparesis resolved  Chronic hypoxic respiratory failure  Hypertension  Irritable bowel syndrome  Degenerative disc disease  Bipolar disorder   Chronic pain syndrome  Gastroesophageal reflux disease    PLAN  Discharge home  Discharge summary dictated    RAJI RODRIGUES MD    Copied text in this note has been reviewed and is accurate as of 02/29/24

## 2024-02-29 NOTE — PROGRESS NOTES
"  Infectious Diseases Progress Note    Bia Casas MD     [facility]  [place]: 0 days  Patient Identification:  Name: Carolina Cooney  Age: 63 y.o.  Sex: female  :  1960  MRN: [id number]         Primary Care Physician: Hardeep Ames MD        Subjective: Feeling better decreased pain and discomfort in her left foot.  Able to ambulate with a walker.  Decreased redness and swelling.  Interval History: See consultation note.    Objective:    Scheduled Meds:amitriptyline, 50 mg, Oral, Nightly  amLODIPine, 10 mg, Oral, Q24H  ceFAZolin, 2,000 mg, Intravenous, Q8H  DULoxetine, 30 mg, Oral, Daily  enoxaparin, 1 mg/kg, Subcutaneous, Q12H  lithium carbonate, 150 mg, Oral, BID  metoprolol tartrate, 25 mg, Oral, Q12H  montelukast, 10 mg, Oral, Daily  ondansetron, 4 mg, Intravenous, Q6H  pantoprazole, 40 mg, Intravenous, Q12H  tiotropium bromide monohydrate, 1 puff, Inhalation, Daily - RT  vancomycin, 750 mg, Intravenous, Q12H      Continuous Infusions:Pharmacy to dose vancomycin,   sodium chloride 0.9 % with KCl 20 mEq, 75 mL/hr, Last Rate: 75 mL/hr (24)        Vital signs in last 24 hours:  Temp:  [97.1 °F (36.2 °C)-98.8 °F (37.1 °C)] 98.2 °F (36.8 °C)  Heart Rate:  [] 93  Resp:  [16-20] 16  BP: (140-171)/() 144/90    Intake/Output:    Intake/Output Summary (Last 24 hours) at 2024 0709  Last data filed at 2024 2307  Gross per 24 hour   Intake 885 ml   Output --   Net 885 ml       Exam:  /90 (BP Location: Left arm, Patient Position: Lying)   Pulse 93   Temp 98.2 °F (36.8 °C) (Oral)   Resp 16   Ht 160 cm (62.99\")   Wt 43.5 kg (95 lb 14.4 oz)   SpO2 100%   BMI 16.99 kg/m²   Patient is examined using the personal protective equipment as per guidelines from infection control for this particular patient as enacted.  Hand washing was performed before and after patient interaction.  General Appearance:    Alert, cooperative, no distress, AAOx3                          " Head:    Normocephalic, without obvious abnormality, atraumatic                           Eyes:    PERRL, conjunctivae/corneas clear, EOM's intact, both eyes                         Throat:   Lips, tongue, gums normal; oral mucosa pink and moist                           Neck:   Supple, symmetrical, trachea midline, no JVD                         Lungs:    Clear to auscultation bilaterally, respirations unlabored                 Chest Wall:    No tenderness or deformity                          Heart:  S1-S2 regular                  Abdomen:   Soft nontender                 Extremities: Significant decrease in erythema and redness of the left foot.                        Pulses:   Pulses palpable in all extremities                            Skin:   Skin is warm and dry,  no rashes or palpable lesions                  Neurologic: Alert and oriented and in no acute distress trying to ambulate with walker.       Data Review:    I reviewed the patient's new clinical results.  Results from last 7 days   Lab Units 02/29/24  0532 02/28/24  0506 02/27/24  0511 02/26/24  1939   WBC 10*3/mm3 14.40* 22.84* 15.10* 17.14*   HEMOGLOBIN g/dL 10.8* 11.5* 11.2* 11.0*   PLATELETS 10*3/mm3 536* 672* 566* 540*     Results from last 7 days   Lab Units 02/29/24  0532 02/28/24  0506 02/27/24  0511 02/26/24 2004   SODIUM mmol/L 137 137 140 139   POTASSIUM mmol/L 4.5 3.1* 4.0 4.2   CHLORIDE mmol/L 102 93* 100 100   CO2 mmol/L 26.0 28.6 26.6 30.3*   BUN mg/dL 19 22 16 23   CREATININE mg/dL 0.82 0.66 0.55* 0.57   CALCIUM mg/dL 8.8 9.1 9.1 9.3   GLUCOSE mg/dL 117* 143* 107* 103*     Microbiology Results (last 10 days)       Procedure Component Value - Date/Time    MRSA Screen, PCR (Inpatient) - Swab, Nares [[id number]]  (Normal) Collected: 02/28/24 0623    Lab Status: Final result Specimen: Swab from Nares Updated: 02/28/24 0759     MRSA PCR No MRSA Detected    Narrative:      The negative predictive value of this diagnostic test is high and  should only be used to consider de-escalating anti-MRSA therapy. A positive result may indicate colonization with MRSA and must be correlated clinically.    Blood Culture - Blood, Arm, Left [[id number]]  (Normal) Collected: 02/26/24 2059    Lab Status: Preliminary result Specimen: Blood from Arm, Left Updated: 02/28/24 2116     Blood Culture No growth at 2 days              Assessment:    Cellulitis of left lower extremity  1-traumatic cellulitis of the left foot with symptoms starting after a reported injury rule out underlying nondisplaced fracture of the left foot with inflammation occurring while on Augmentin suggesting possibility of other pathogens not covered by Augmentin including possibility of MRSA and gram-negative rods.  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  5-history of hypertension  6-DVT of the left leg     Recommendations/Discussions:  She seems to be doing very well  Her MRSA screen is negative  Would recommend DC IV antibiotics and change her to oral Keflex and doxycycline for 7 to 10 days.    Management of other issues including left lower extremity DVT per primary team.  Bia Casas MD  2/29/2024  07:09 EST    Parts of this note may be an electronic transcription/translation of spoken language to printed text using the Dragon dictation system.

## 2024-02-29 NOTE — PLAN OF CARE
Goal Outcome Evaluation:  Plan of Care Reviewed With: patient, spouse        Progress: no change  Outcome Evaluation: afebrile, tachycardiac at times, 4LO2 NC, ivf infusing per order, iv abx given as ordered, dilaudid and robaxin given for pain control, scheduled zofran and prn phenergan given for nausea, up w/ assist and walker, CLD, morning labs ordered, voiding freely, Lovenox given, spouse at bedside

## 2024-03-02 LAB — BACTERIA SPEC AEROBE CULT: NORMAL

## 2024-03-19 VITALS
SYSTOLIC BLOOD PRESSURE: 91 MMHG | OXYGEN SATURATION: 96 % | HEART RATE: 82 BPM | HEIGHT: 63 IN | DIASTOLIC BLOOD PRESSURE: 61 MMHG | WEIGHT: 92 LBS

## 2024-03-20 ENCOUNTER — OFFICE (OUTPATIENT)
Dept: URBAN - METROPOLITAN AREA CLINIC 76 | Facility: CLINIC | Age: 64
End: 2024-03-20

## 2024-03-20 DIAGNOSIS — K59.00 CONSTIPATION, UNSPECIFIED: ICD-10-CM

## 2024-03-20 DIAGNOSIS — R13.10 DYSPHAGIA, UNSPECIFIED: ICD-10-CM

## 2024-03-20 DIAGNOSIS — K21.9 GASTRO-ESOPHAGEAL REFLUX DISEASE WITHOUT ESOPHAGITIS: ICD-10-CM

## 2024-03-20 DIAGNOSIS — K31.84 GASTROPARESIS: ICD-10-CM

## 2024-03-20 DIAGNOSIS — R11.2 NAUSEA WITH VOMITING, UNSPECIFIED: ICD-10-CM

## 2024-03-20 PROCEDURE — 99214 OFFICE O/P EST MOD 30 MIN: CPT

## 2024-03-20 RX ORDER — SUCRALFATE 1 G/1
4 TABLET ORAL
Qty: 120 | Refills: 5 | Status: COMPLETED
Start: 2024-03-20 | End: 2024-06-26

## 2024-06-26 ENCOUNTER — OFFICE (OUTPATIENT)
Dept: URBAN - METROPOLITAN AREA CLINIC 76 | Facility: CLINIC | Age: 64
End: 2024-06-26

## 2024-06-26 VITALS
WEIGHT: 84 LBS | HEIGHT: 63 IN | HEART RATE: 65 BPM | DIASTOLIC BLOOD PRESSURE: 70 MMHG | SYSTOLIC BLOOD PRESSURE: 112 MMHG

## 2024-06-26 DIAGNOSIS — K59.00 CONSTIPATION, UNSPECIFIED: ICD-10-CM

## 2024-06-26 DIAGNOSIS — R11.2 NAUSEA WITH VOMITING, UNSPECIFIED: ICD-10-CM

## 2024-06-26 DIAGNOSIS — K20.80 OTHER ESOPHAGITIS WITHOUT BLEEDING: ICD-10-CM

## 2024-06-26 DIAGNOSIS — K31.84 GASTROPARESIS: ICD-10-CM

## 2024-06-26 DIAGNOSIS — K21.9 GASTRO-ESOPHAGEAL REFLUX DISEASE WITHOUT ESOPHAGITIS: ICD-10-CM

## 2024-06-26 DIAGNOSIS — R10.9 UNSPECIFIED ABDOMINAL PAIN: ICD-10-CM

## 2024-06-26 DIAGNOSIS — K22.10 ULCER OF ESOPHAGUS WITHOUT BLEEDING: ICD-10-CM

## 2024-06-26 PROCEDURE — 99214 OFFICE O/P EST MOD 30 MIN: CPT

## 2025-07-14 ENCOUNTER — OFFICE VISIT (OUTPATIENT)
Dept: OBSTETRICS AND GYNECOLOGY | Facility: CLINIC | Age: 65
End: 2025-07-14
Payer: COMMERCIAL

## 2025-07-14 VITALS
SYSTOLIC BLOOD PRESSURE: 120 MMHG | WEIGHT: 81 LBS | HEIGHT: 63 IN | BODY MASS INDEX: 14.35 KG/M2 | DIASTOLIC BLOOD PRESSURE: 60 MMHG

## 2025-07-14 DIAGNOSIS — N95.1 MENOPAUSAL STATE: ICD-10-CM

## 2025-07-14 DIAGNOSIS — Z01.419 ENCOUNTER FOR GYNECOLOGICAL EXAMINATION WITHOUT ABNORMAL FINDING: Primary | ICD-10-CM

## 2025-07-14 DIAGNOSIS — N95.2 VAGINAL ATROPHY: ICD-10-CM

## 2025-07-14 DIAGNOSIS — Z12.11 SCREENING FOR MALIGNANT NEOPLASM OF COLON: ICD-10-CM

## 2025-07-14 RX ORDER — ALBUTEROL SULFATE 90 UG/1
INHALANT RESPIRATORY (INHALATION)
COMMUNITY

## 2025-07-14 RX ORDER — FERROUS GLUCONATE 324(38)MG
324 TABLET ORAL DAILY
COMMUNITY
Start: 2025-06-26 | End: 2025-07-26

## 2025-07-14 RX ORDER — METHOCARBAMOL 500 MG/1
TABLET, FILM COATED ORAL
COMMUNITY

## 2025-07-14 RX ORDER — ESTRADIOL 0.1 MG/G
2 CREAM VAGINAL NIGHTLY
Qty: 42.5 G | Refills: 5 | Status: SHIPPED | OUTPATIENT
Start: 2025-07-14

## 2025-07-14 RX ORDER — VONOPRAZAN FUMARATE 26.72 MG/1
TABLET ORAL
COMMUNITY

## 2025-07-14 RX ORDER — FLUTICASONE FUROATE, UMECLIDINIUM BROMIDE AND VILANTEROL TRIFENATATE 100; 62.5; 25 UG/1; UG/1; UG/1
POWDER RESPIRATORY (INHALATION)
COMMUNITY

## 2025-07-14 NOTE — PATIENT INSTRUCTIONS
It was such a pleasure meeting you today and getting to know you. I'm truly honored to be a part of your  healthcare journey, and I appreciate the trust you've placed in me. I hope you left today feeling seen,  supported, and empowered. If any questions come up or you need anything at all, please don't hesitate to  reach out. I'm looking forward to walking alongside you in your care and seeing you again soon!

## 2025-07-14 NOTE — PROGRESS NOTES
GYN ANNUAL EXAM   Chief Complaint   Patient presents with    ngyn     Here today for AE and needs MX       HPI    Carolina is a 64 y.o. female who presents for annual well woman exam. She is a new patient, having seen Dr. Haskins in the remote past.     OB History          4    Para   4    Term   4            AB        Living   4         SAB        IAB        Ectopic        Molar        Multiple        Live Births                    SUBJECTIVE    MENSTRUAL & SEXUAL HEALTH  LMP: No LMP recorded. Patient is postmenopausal.  Last pap: Unknown previous Pap  History of abnormal pap: no  History of STD: No  Family history of cancer: uterine cancer       Family history of breast cancer: no  Performs SBE: performs monthly.  Mammogram: 2015, Birads I (Normal).  History of abnormal mammogram: no  Colonoscopy: unknown year  Bleeding since LMP: no  Vasomotor symptoms: no  HRT: no  Incontinence: Patient reports that she is not currently experiencing any symptoms of urinary incontinence.   Dyspareunia: no    Past Medical History:   Diagnosis Date    Anxiety and depression     Arthritis     GERD (gastroesophageal reflux disease)     Hypertension     IBS (irritable bowel syndrome)        Past Surgical History:   Procedure Laterality Date    BACK SURGERY       SECTION      GASTROSTOMY FEEDING TUBE INSERTION      LAPAROSCOPIC TUBAL LIGATION           Current Outpatient Medications:     ferrous gluconate (FERGON) 324 MG tablet, Take 1 tablet by mouth Daily., Disp: , Rfl:     Fluticasone-Umeclidin-Vilant (Trelegy Ellipta) 100-62.5-25 MCG/ACT inhaler, 60, Disp: , Rfl:     ipratropium-albuterol (COMBIVENT RESPIMAT)  MCG/ACT inhaler, Inhale 1 puff 2 (Two) Times a Day., Disp: , Rfl:     promethazine (PHENERGAN) 25 MG tablet, , Disp: , Rfl:     Voquezna 20 MG tablet, TAKE 1 TABLET BY MOUTH EVERY DAY AS DIRECTED FOR 30 DAYS, Disp: , Rfl:     albuterol sulfate  (90 Base) MCG/ACT inhaler, 8.5, Disp: , Rfl:     " estradiol (ESTRACE VAGINAL) 0.1 MG/GM vaginal cream, Insert 2 g into the vagina Every Night. Insert 2 grams into the vagina and place a pea sized amount of cream at the introitus nightly for 14 nights. After that, use 2 to 3 times per week for maintenance., Disp: 42.5 g, Rfl: 5    methocarbamol (ROBAXIN) 500 MG tablet, 90, Disp: , Rfl:     metoprolol tartrate (LOPRESSOR) 25 MG tablet, Take 1 tablet by mouth Every 12 (Twelve) Hours for 30 days., Disp: 60 tablet, Rfl: 0    montelukast (SINGULAIR) 10 MG tablet, Take 1 tablet by mouth Daily. (Patient not taking: Reported on 7/14/2025), Disp: , Rfl:     tiotropium bromide monohydrate (SPIRIVA RESPIMAT) 2.5 MCG/ACT aerosol solution inhaler, Inhale 1 puff Daily. (Patient not taking: Reported on 7/14/2025), Disp: , Rfl:     Allergies   Allergen Reactions    Penicillins Hives       Social History     Tobacco Use    Smoking status: Never    Smokeless tobacco: Never   Vaping Use    Vaping status: Never Used   Substance Use Topics    Alcohol use: Yes     Comment: social    Drug use: No       Family History   Problem Relation Age of Onset    Depression Mother     Osteoporosis Mother     Alcohol abuse Mother     Uterine cancer Mother     Depression Maternal Grandmother        Review of Systems   Constitutional:  Negative for fatigue, unexpected weight gain and unexpected weight loss.   Gastrointestinal:  Negative for abdominal pain.   Genitourinary:  Negative for decreased libido, difficulty urinating, dyspareunia, dysuria, pelvic pain, pelvic pressure, urgency, urinary incontinence and vaginal discharge.   All other systems reviewed and are negative.      OBJECTIVE    /60   Ht 160 cm (62.99\")   Wt 36.7 kg (81 lb)   BMI 14.35 kg/m²     Physical Exam  Constitutional:       General: She is awake. She is not in acute distress.     Appearance: Normal appearance. She is well-developed, well-groomed and normal weight. She is not ill-appearing.   Genitourinary:      Vulva, " bladder and urethral meatus normal.      No lesions in the vagina.      Right Labia: No rash, tenderness, lesions or skin changes.     Left Labia: No tenderness, lesions, skin changes or rash.     No labial fusion noted.      No inguinal adenopathy present in the right or left side.     No vaginal discharge, erythema, tenderness, bleeding or granulation tissue.      No vaginal prolapse present.     Moderate vaginal atrophy present.       Right Adnexa: not tender and no mass present.     Left Adnexa: not tender and no mass present.     No cervical discharge, friability, lesion, polyp, eversion or elongation.      Uterus is not enlarged, tender or irregular.      No urethral prolapse or discharge present.      Pelvic floor neuro is intact.     Pelvic exam was performed with patient in the lithotomy position.   Breasts:     Breasts are symmetrical.      Breasts are soft.     Right: Normal. No inverted nipple, mass, nipple discharge or skin change.      Left: Normal. No inverted nipple, mass, nipple discharge or skin change.   HENT:      Head: Normocephalic and atraumatic.   Eyes:      General: No scleral icterus.     Conjunctiva/sclera: Conjunctivae normal.   Cardiovascular:      Rate and Rhythm: Normal rate and regular rhythm.      Heart sounds: Normal heart sounds.   Pulmonary:      Effort: Pulmonary effort is normal.      Breath sounds: Normal breath sounds.   Abdominal:      Palpations: Abdomen is soft.   Musculoskeletal:         General: Normal range of motion.      Cervical back: Normal range of motion.   Lymphadenopathy:      Lower Body: No right inguinal adenopathy. No left inguinal adenopathy.   Neurological:      General: No focal deficit present.      Mental Status: She is alert and oriented to person, place, and time.   Skin:     General: Skin is warm and dry.      Coloration: Skin is not jaundiced or pale.   Psychiatric:         Behavior: Behavior normal. Behavior is cooperative.   Vitals reviewed.          ASSESSMENT    Diagnoses and all orders for this visit:    1. Encounter for gynecological examination without abnormal finding (Primary)  -     IGP, Apt HPV,rfx 16 / 18,45    2. Menopausal state  -     estradiol (ESTRACE VAGINAL) 0.1 MG/GM vaginal cream; Insert 2 g into the vagina Every Night. Insert 2 grams into the vagina and place a pea sized amount of cream at the introitus nightly for 14 nights. After that, use 2 to 3 times per week for maintenance.  Dispense: 42.5 g; Refill: 5    3. Vaginal atrophy  -     estradiol (ESTRACE VAGINAL) 0.1 MG/GM vaginal cream; Insert 2 g into the vagina Every Night. Insert 2 grams into the vagina and place a pea sized amount of cream at the introitus nightly for 14 nights. After that, use 2 to 3 times per week for maintenance.  Dispense: 42.5 g; Refill: 5         PLAN   WELL WOMAN EXAM: Pap smear collected today. Recommend MVI daily.    MENOPAUSE: post menopausal status - discussed importance of returning to care if she experiences PMB..   SMOKING STATUS: non smoker.  BONE HEALTH: weight bearing exercise, dietary calcium recommendations and vitamin D reviewed.  COLON HEALTH: screening colonoscopy or Cologuard recommended.  BREAST HEALTH: Encouraged annual mammogram screening starting at age 40. Instructed on how to perform SBE. Encouraged breast health self awareness.  EXERCISE: Encouraged 150 minutes of exercise per week if not medially contraindicated.   BMI: Body mass index is 14.35 kg/m².     Return for annual exam or as needed before.    I spent 30 minutes caring for Carolina on this date of service. This time includes time spent by me in the following activities: preparing for the visit, reviewing tests, performing a medically appropriate examination and/or evaluation, counseling and educating the patient/family/caregiver, referring and communicating with other health care professionals, documenting information in the medical record, independently interpreting  results and communicating that information with the patient/family/caregiver, care coordination, ordering medications, ordering test(s), ordering procedure(s), obtaining a separately obtained history, and reviewing a separately obtained history.    Tana Sheppard CNM  7/15/2025  09:18 EDT

## 2025-07-17 ENCOUNTER — RESULTS FOLLOW-UP (OUTPATIENT)
Dept: OBSTETRICS AND GYNECOLOGY | Facility: CLINIC | Age: 65
End: 2025-07-17
Payer: COMMERCIAL

## 2025-07-17 LAB
CYTOLOGIST CVX/VAG CYTO: NORMAL
CYTOLOGY CVX/VAG DOC CYTO: NORMAL
CYTOLOGY CVX/VAG DOC THIN PREP: NORMAL
DX ICD CODE: NORMAL
HPV I/H RISK 4 DNA CVX QL PROBE+SIG AMP: NEGATIVE
Lab: NORMAL
OTHER STN SPEC: NORMAL
RECOM F/U CVX/VAG CYTO: NORMAL
SERVICE CMNT-IMP: NORMAL
STAT OF ADQ CVX/VAG CYTO-IMP: NORMAL

## 2025-08-07 ENCOUNTER — OFFICE (OUTPATIENT)
Dept: URBAN - METROPOLITAN AREA CLINIC 76 | Facility: CLINIC | Age: 65
End: 2025-08-07
Payer: COMMERCIAL

## 2025-08-07 VITALS
HEART RATE: 81 BPM | HEIGHT: 63 IN | SYSTOLIC BLOOD PRESSURE: 110 MMHG | WEIGHT: 84 LBS | OXYGEN SATURATION: 98 % | DIASTOLIC BLOOD PRESSURE: 72 MMHG

## 2025-08-07 DIAGNOSIS — K31.84 GASTROPARESIS: ICD-10-CM

## 2025-08-07 DIAGNOSIS — R63.4 ABNORMAL WEIGHT LOSS: ICD-10-CM

## 2025-08-07 DIAGNOSIS — K21.9 GASTRO-ESOPHAGEAL REFLUX DISEASE WITHOUT ESOPHAGITIS: ICD-10-CM

## 2025-08-07 DIAGNOSIS — D50.0 IRON DEFICIENCY ANEMIA SECONDARY TO BLOOD LOSS (CHRONIC): ICD-10-CM

## 2025-08-07 DIAGNOSIS — K20.80 OTHER ESOPHAGITIS WITHOUT BLEEDING: ICD-10-CM

## 2025-08-07 DIAGNOSIS — K59.00 CONSTIPATION, UNSPECIFIED: ICD-10-CM

## 2025-08-07 PROCEDURE — 99214 OFFICE O/P EST MOD 30 MIN: CPT | Performed by: INTERNAL MEDICINE

## 2025-08-07 RX ORDER — SCOLOPAMINE TRANSDERMAL SYSTEM 1 MG/1
PATCH, EXTENDED RELEASE TRANSDERMAL
Qty: 10 | Refills: 3 | Status: ACTIVE
Start: 2025-08-07

## 2025-08-07 RX ORDER — LINACLOTIDE 145 UG/1
CAPSULE, GELATIN COATED ORAL
Qty: 90 | Refills: 3 | Status: ACTIVE
Start: 2025-08-07

## 2025-08-08 ENCOUNTER — TELEPHONE (OUTPATIENT)
Dept: OBSTETRICS AND GYNECOLOGY | Facility: CLINIC | Age: 65
End: 2025-08-08
Payer: COMMERCIAL